# Patient Record
Sex: FEMALE | Race: WHITE | NOT HISPANIC OR LATINO | URBAN - METROPOLITAN AREA
[De-identification: names, ages, dates, MRNs, and addresses within clinical notes are randomized per-mention and may not be internally consistent; named-entity substitution may affect disease eponyms.]

---

## 2018-03-24 ENCOUNTER — EMERGENCY (EMERGENCY)
Facility: HOSPITAL | Age: 83
LOS: 1 days | Discharge: HOME | End: 2018-03-24
Admitting: EMERGENCY MEDICINE

## 2018-03-24 VITALS
TEMPERATURE: 98 F | SYSTOLIC BLOOD PRESSURE: 132 MMHG | DIASTOLIC BLOOD PRESSURE: 84 MMHG | RESPIRATION RATE: 18 BRPM | HEART RATE: 101 BPM | OXYGEN SATURATION: 98 %

## 2018-03-28 DIAGNOSIS — K08.89 OTHER SPECIFIED DISORDERS OF TEETH AND SUPPORTING STRUCTURES: ICD-10-CM

## 2018-03-29 ENCOUNTER — INPATIENT (INPATIENT)
Facility: HOSPITAL | Age: 83
LOS: 3 days | Discharge: HOME | DRG: 375 | End: 2018-04-02
Attending: STUDENT IN AN ORGANIZED HEALTH CARE EDUCATION/TRAINING PROGRAM | Admitting: STUDENT IN AN ORGANIZED HEALTH CARE EDUCATION/TRAINING PROGRAM
Payer: MEDICARE

## 2018-03-29 VITALS
SYSTOLIC BLOOD PRESSURE: 117 MMHG | DIASTOLIC BLOOD PRESSURE: 72 MMHG | HEART RATE: 109 BPM | WEIGHT: 158.95 LBS | OXYGEN SATURATION: 96 % | TEMPERATURE: 98 F | RESPIRATION RATE: 18 BRPM

## 2018-03-29 LAB
ALBUMIN SERPL ELPH-MCNC: 4.1 G/DL — SIGNIFICANT CHANGE UP (ref 3.3–5)
ALP SERPL-CCNC: 90 U/L — SIGNIFICANT CHANGE UP (ref 40–120)
ALT FLD-CCNC: 10 U/L — SIGNIFICANT CHANGE UP (ref 10–45)
ANION GAP SERPL CALC-SCNC: 17 MMOL/L — SIGNIFICANT CHANGE UP (ref 5–17)
AST SERPL-CCNC: 20 U/L — SIGNIFICANT CHANGE UP (ref 10–40)
BASOPHILS NFR BLD AUTO: 0.2 % — SIGNIFICANT CHANGE UP (ref 0–2)
BILIRUB SERPL-MCNC: 0.7 MG/DL — SIGNIFICANT CHANGE UP (ref 0.2–1.2)
BUN SERPL-MCNC: 15 MG/DL — SIGNIFICANT CHANGE UP (ref 7–23)
CALCIUM SERPL-MCNC: 11.3 MG/DL — HIGH (ref 8.4–10.5)
CHLORIDE SERPL-SCNC: 95 MMOL/L — LOW (ref 96–108)
CO2 SERPL-SCNC: 22 MMOL/L — SIGNIFICANT CHANGE UP (ref 22–31)
CREAT SERPL-MCNC: 1.03 MG/DL — SIGNIFICANT CHANGE UP (ref 0.5–1.3)
EOSINOPHIL NFR BLD AUTO: 0.6 % — SIGNIFICANT CHANGE UP (ref 0–6)
GLUCOSE SERPL-MCNC: 147 MG/DL — HIGH (ref 70–99)
HCT VFR BLD CALC: 38 % — SIGNIFICANT CHANGE UP (ref 34.5–45)
HGB BLD-MCNC: 13.2 G/DL — SIGNIFICANT CHANGE UP (ref 11.5–15.5)
LIDOCAIN IGE QN: 22 U/L — SIGNIFICANT CHANGE UP (ref 7–60)
LYMPHOCYTES # BLD AUTO: 12.3 % — LOW (ref 13–44)
MAGNESIUM SERPL-MCNC: 1.7 MG/DL — SIGNIFICANT CHANGE UP (ref 1.6–2.6)
MCHC RBC-ENTMCNC: 29.9 PG — SIGNIFICANT CHANGE UP (ref 27–34)
MCHC RBC-ENTMCNC: 34.7 G/DL — SIGNIFICANT CHANGE UP (ref 32–36)
MCV RBC AUTO: 86 FL — SIGNIFICANT CHANGE UP (ref 80–100)
MONOCYTES NFR BLD AUTO: 10.7 % — SIGNIFICANT CHANGE UP (ref 2–14)
NEUTROPHILS NFR BLD AUTO: 76.2 % — SIGNIFICANT CHANGE UP (ref 43–77)
PLATELET # BLD AUTO: 312 K/UL — SIGNIFICANT CHANGE UP (ref 150–400)
POTASSIUM SERPL-MCNC: 4.1 MMOL/L — SIGNIFICANT CHANGE UP (ref 3.5–5.3)
POTASSIUM SERPL-SCNC: 4.1 MMOL/L — SIGNIFICANT CHANGE UP (ref 3.5–5.3)
PROT SERPL-MCNC: 8.5 G/DL — HIGH (ref 6–8.3)
RBC # BLD: 4.42 M/UL — SIGNIFICANT CHANGE UP (ref 3.8–5.2)
RBC # FLD: 12.8 % — SIGNIFICANT CHANGE UP (ref 10.3–16.9)
SODIUM SERPL-SCNC: 134 MMOL/L — LOW (ref 135–145)
WBC # BLD: 13 K/UL — HIGH (ref 3.8–10.5)
WBC # FLD AUTO: 13 K/UL — HIGH (ref 3.8–10.5)

## 2018-03-29 PROCEDURE — 74176 CT ABD & PELVIS W/O CONTRAST: CPT | Mod: 26

## 2018-03-29 PROCEDURE — 99285 EMERGENCY DEPT VISIT HI MDM: CPT

## 2018-03-29 RX ORDER — IOHEXOL 300 MG/ML
50 INJECTION, SOLUTION INTRAVENOUS ONCE
Qty: 0 | Refills: 0 | Status: COMPLETED | OUTPATIENT
Start: 2018-03-29 | End: 2018-03-29

## 2018-03-29 RX ORDER — SODIUM CHLORIDE 9 MG/ML
3 INJECTION INTRAMUSCULAR; INTRAVENOUS; SUBCUTANEOUS ONCE
Qty: 0 | Refills: 0 | Status: COMPLETED | OUTPATIENT
Start: 2018-03-29 | End: 2018-03-29

## 2018-03-29 RX ORDER — SODIUM CHLORIDE 9 MG/ML
1000 INJECTION INTRAMUSCULAR; INTRAVENOUS; SUBCUTANEOUS
Qty: 0 | Refills: 0 | Status: DISCONTINUED | OUTPATIENT
Start: 2018-03-29 | End: 2018-03-30

## 2018-03-29 RX ADMIN — IOHEXOL 50 MILLILITER(S): 300 INJECTION, SOLUTION INTRAVENOUS at 20:16

## 2018-03-29 RX ADMIN — SODIUM CHLORIDE 125 MILLILITER(S): 9 INJECTION INTRAMUSCULAR; INTRAVENOUS; SUBCUTANEOUS at 20:16

## 2018-03-29 RX ADMIN — SODIUM CHLORIDE 3 MILLILITER(S): 9 INJECTION INTRAMUSCULAR; INTRAVENOUS; SUBCUTANEOUS at 20:16

## 2018-03-29 NOTE — ED PROVIDER NOTE - MEDICAL DECISION MAKING DETAILS
Pt p/w abdominal pain, constipation, intermittent diarrhea. DDx includes diver Pt p/w abdominal pain, constipation, intermittent diarrhea. DDx includes diverticulitis, colitis, partial SBO, SBO, constipation, other intra-abdominal pathology. Check labs, CT a/p, IVF, NPO. Declines analgesia at this time. Dispo pending w/u and clinical status

## 2018-03-29 NOTE — ED PROVIDER NOTE - OBJECTIVE STATEMENT
Pt with PMHx HTN, HLD Pt with PMHx HTN, HLD, "hoarder," pre-DM, ? CAD, uterine CA s/p hysterectomy 2001, s/p RT p/w abdominal pain. Pt reports diarrhea s/p trip to [place] in 1992. Pt realized 3-4 weeks ago she could take Imodium for diarrhea. Pt reports 1 week ago she took 2 tablets of Imodium 1 day, followed by 1 tablet the next day, and now has had no BM in 4-5 days. Pt c/o crampy / achy constant lower abdominal pain, LLQ > RLQ, moderate in severity, with associated nausea and anorexia. No f/c. No urinary complaints. Pt reports baseline urinary incontinence. Pt states no one has been able to tell her what the cause of her diarrhea is.

## 2018-03-29 NOTE — ED PROVIDER NOTE - PROGRESS NOTE DETAILS
Message left for Dr Voss at this time regarding this pt Pt reports in 2001 when diagnosed w/ uterine CA, she was told it was in a lymph node. Pt reports she has not seen her gyn in approx 2 years, and no one told her she still had cancer in her body, or new cancer. Pt wishes treatment if possible. D/w pt d/c w/ outpt f/u vs admission for onset of w/u. Pt has opted to be admitted.

## 2018-03-29 NOTE — ED PROVIDER NOTE - GASTROINTESTINAL, MLM
Abd soft, ND, hypoactive BS. Mild diffuse lower abd ttp, L > R. No guarding, rebound, or rigidity. No pulsatile abdominal masses. No organomegaly appreciated. No CVAT

## 2018-03-29 NOTE — ED ADULT NURSE NOTE - OBJECTIVE STATEMENT
81 y/o female c/o abdominal pain and no BM x4 days. Patient states "I usually have diarrhea so I take a lot of imodium, I haven't had diarrhea but I kept taking the imodium and now I haven't gone in a couple days." Reports abdomen seems more distended than usual. RLQ tender on palpation.

## 2018-03-29 NOTE — ED ADULT TRIAGE NOTE - OTHER COMPLAINTS
pt reports decreased appetite and generalized abdominal pain over the past 4 weeks. pt states she had diarrhea early this week. denies n/v, fever.

## 2018-03-29 NOTE — ED PROVIDER NOTE - CARE PLAN
Principal Discharge DX:	Malignancy Principal Discharge DX:	Malignancy  Secondary Diagnosis:	Abdominal pain, unspecified abdominal location

## 2018-03-29 NOTE — ED ADULT NURSE NOTE - CHPI ED SYMPTOMS NEG
no diarrhea/no fever/no blood in stool/no dysuria/no vomiting/no chills/no hematuria/no nausea/no burning urination

## 2018-03-30 ENCOUNTER — TRANSCRIPTION ENCOUNTER (OUTPATIENT)
Age: 83
End: 2018-03-30

## 2018-03-30 DIAGNOSIS — E03.9 HYPOTHYROIDISM, UNSPECIFIED: ICD-10-CM

## 2018-03-30 DIAGNOSIS — I10 ESSENTIAL (PRIMARY) HYPERTENSION: ICD-10-CM

## 2018-03-30 DIAGNOSIS — R73.03 PREDIABETES: ICD-10-CM

## 2018-03-30 DIAGNOSIS — C55 MALIGNANT NEOPLASM OF UTERUS, PART UNSPECIFIED: ICD-10-CM

## 2018-03-30 DIAGNOSIS — I25.10 ATHEROSCLEROTIC HEART DISEASE OF NATIVE CORONARY ARTERY WITHOUT ANGINA PECTORIS: ICD-10-CM

## 2018-03-30 DIAGNOSIS — Z98.890 OTHER SPECIFIED POSTPROCEDURAL STATES: Chronic | ICD-10-CM

## 2018-03-30 DIAGNOSIS — R19.00 INTRA-ABDOMINAL AND PELVIC SWELLING, MASS AND LUMP, UNSPECIFIED SITE: ICD-10-CM

## 2018-03-30 DIAGNOSIS — Z51.5 ENCOUNTER FOR PALLIATIVE CARE: ICD-10-CM

## 2018-03-30 DIAGNOSIS — K21.9 GASTRO-ESOPHAGEAL REFLUX DISEASE WITHOUT ESOPHAGITIS: ICD-10-CM

## 2018-03-30 DIAGNOSIS — J45.909 UNSPECIFIED ASTHMA, UNCOMPLICATED: ICD-10-CM

## 2018-03-30 DIAGNOSIS — R10.9 UNSPECIFIED ABDOMINAL PAIN: ICD-10-CM

## 2018-03-30 DIAGNOSIS — R63.8 OTHER SYMPTOMS AND SIGNS CONCERNING FOOD AND FLUID INTAKE: ICD-10-CM

## 2018-03-30 DIAGNOSIS — Z71.89 OTHER SPECIFIED COUNSELING: ICD-10-CM

## 2018-03-30 LAB
ANION GAP SERPL CALC-SCNC: 12 MMOL/L — SIGNIFICANT CHANGE UP (ref 5–17)
APPEARANCE UR: CLEAR — SIGNIFICANT CHANGE UP
BACTERIA # UR AUTO: PRESENT /HPF
BILIRUB UR-MCNC: NEGATIVE — SIGNIFICANT CHANGE UP
BUN SERPL-MCNC: 14 MG/DL — SIGNIFICANT CHANGE UP (ref 7–23)
CALCIUM SERPL-MCNC: 9.8 MG/DL — SIGNIFICANT CHANGE UP (ref 8.4–10.5)
CHLORIDE SERPL-SCNC: 99 MMOL/L — SIGNIFICANT CHANGE UP (ref 96–108)
CO2 SERPL-SCNC: 23 MMOL/L — SIGNIFICANT CHANGE UP (ref 22–31)
COLOR SPEC: YELLOW — SIGNIFICANT CHANGE UP
CREAT SERPL-MCNC: 0.92 MG/DL — SIGNIFICANT CHANGE UP (ref 0.5–1.3)
DIFF PNL FLD: (no result)
EPI CELLS # UR: SIGNIFICANT CHANGE UP /HPF (ref 0–5)
GLUCOSE BLDC GLUCOMTR-MCNC: 112 MG/DL — HIGH (ref 70–99)
GLUCOSE BLDC GLUCOMTR-MCNC: 122 MG/DL — HIGH (ref 70–99)
GLUCOSE BLDC GLUCOMTR-MCNC: 122 MG/DL — HIGH (ref 70–99)
GLUCOSE BLDC GLUCOMTR-MCNC: 93 MG/DL — SIGNIFICANT CHANGE UP (ref 70–99)
GLUCOSE SERPL-MCNC: 112 MG/DL — HIGH (ref 70–99)
GLUCOSE UR QL: NEGATIVE — SIGNIFICANT CHANGE UP
HBA1C BLD-MCNC: 5.4 % — SIGNIFICANT CHANGE UP (ref 4–5.6)
HCT VFR BLD CALC: 33.6 % — LOW (ref 34.5–45)
HGB BLD-MCNC: 11.1 G/DL — LOW (ref 11.5–15.5)
KETONES UR-MCNC: NEGATIVE — SIGNIFICANT CHANGE UP
LEUKOCYTE ESTERASE UR-ACNC: NEGATIVE — SIGNIFICANT CHANGE UP
MAGNESIUM SERPL-MCNC: 1.6 MG/DL — SIGNIFICANT CHANGE UP (ref 1.6–2.6)
MCHC RBC-ENTMCNC: 29.4 PG — SIGNIFICANT CHANGE UP (ref 27–34)
MCHC RBC-ENTMCNC: 33 G/DL — SIGNIFICANT CHANGE UP (ref 32–36)
MCV RBC AUTO: 88.9 FL — SIGNIFICANT CHANGE UP (ref 80–100)
NITRITE UR-MCNC: NEGATIVE — SIGNIFICANT CHANGE UP
PH UR: 7 — SIGNIFICANT CHANGE UP (ref 5–8)
PLATELET # BLD AUTO: 236 K/UL — SIGNIFICANT CHANGE UP (ref 150–400)
POTASSIUM SERPL-MCNC: 4 MMOL/L — SIGNIFICANT CHANGE UP (ref 3.5–5.3)
POTASSIUM SERPL-SCNC: 4 MMOL/L — SIGNIFICANT CHANGE UP (ref 3.5–5.3)
PROT UR-MCNC: NEGATIVE MG/DL — SIGNIFICANT CHANGE UP
RBC # BLD: 3.78 M/UL — LOW (ref 3.8–5.2)
RBC # FLD: 13 % — SIGNIFICANT CHANGE UP (ref 10.3–16.9)
RBC CASTS # UR COMP ASSIST: < 5 /HPF — SIGNIFICANT CHANGE UP
SODIUM SERPL-SCNC: 134 MMOL/L — LOW (ref 135–145)
SP GR SPEC: 1.01 — SIGNIFICANT CHANGE UP (ref 1–1.03)
TSH SERPL-MCNC: 2.6 UIU/ML — SIGNIFICANT CHANGE UP (ref 0.35–4.94)
UROBILINOGEN FLD QL: 1 E.U./DL — SIGNIFICANT CHANGE UP
WBC # BLD: 9.6 K/UL — SIGNIFICANT CHANGE UP (ref 3.8–10.5)
WBC # FLD AUTO: 9.6 K/UL — SIGNIFICANT CHANGE UP (ref 3.8–10.5)
WBC UR QL: < 5 /HPF — SIGNIFICANT CHANGE UP

## 2018-03-30 PROCEDURE — 99223 1ST HOSP IP/OBS HIGH 75: CPT

## 2018-03-30 PROCEDURE — 99223 1ST HOSP IP/OBS HIGH 75: CPT | Mod: AI,GC

## 2018-03-30 PROCEDURE — 99497 ADVNCD CARE PLAN 30 MIN: CPT | Mod: 25

## 2018-03-30 PROCEDURE — 71260 CT THORAX DX C+: CPT | Mod: 26

## 2018-03-30 PROCEDURE — 99498 ADVNCD CARE PLAN ADDL 30 MIN: CPT

## 2018-03-30 RX ORDER — RANITIDINE HYDROCHLORIDE 150 MG/1
1 TABLET, FILM COATED ORAL
Qty: 0 | Refills: 0 | COMMUNITY

## 2018-03-30 RX ORDER — OXYBUTYNIN CHLORIDE 5 MG
10 TABLET ORAL DAILY
Qty: 0 | Refills: 0 | Status: DISCONTINUED | OUTPATIENT
Start: 2018-03-30 | End: 2018-04-02

## 2018-03-30 RX ORDER — ATORVASTATIN CALCIUM 80 MG/1
1 TABLET, FILM COATED ORAL
Qty: 0 | Refills: 0 | COMMUNITY

## 2018-03-30 RX ORDER — DEXTROSE 50 % IN WATER 50 %
1 SYRINGE (ML) INTRAVENOUS ONCE
Qty: 0 | Refills: 0 | Status: DISCONTINUED | OUTPATIENT
Start: 2018-03-30 | End: 2018-03-31

## 2018-03-30 RX ORDER — POLYETHYLENE GLYCOL 3350 17 G/17G
17 POWDER, FOR SOLUTION ORAL
Qty: 0 | Refills: 0 | Status: DISCONTINUED | OUTPATIENT
Start: 2018-03-30 | End: 2018-04-02

## 2018-03-30 RX ORDER — ERGOCALCIFEROL 1.25 MG/1
50000 CAPSULE ORAL
Qty: 0 | Refills: 0 | Status: DISCONTINUED | OUTPATIENT
Start: 2018-03-30 | End: 2018-04-02

## 2018-03-30 RX ORDER — INSULIN LISPRO 100/ML
VIAL (ML) SUBCUTANEOUS
Qty: 0 | Refills: 0 | Status: DISCONTINUED | OUTPATIENT
Start: 2018-03-30 | End: 2018-03-31

## 2018-03-30 RX ORDER — LEVOTHYROXINE SODIUM 125 MCG
1 TABLET ORAL
Qty: 0 | Refills: 0 | COMMUNITY

## 2018-03-30 RX ORDER — FAMOTIDINE 10 MG/ML
20 INJECTION INTRAVENOUS
Qty: 0 | Refills: 0 | Status: DISCONTINUED | OUTPATIENT
Start: 2018-03-30 | End: 2018-04-02

## 2018-03-30 RX ORDER — MAGNESIUM SULFATE 500 MG/ML
2 VIAL (ML) INJECTION ONCE
Qty: 0 | Refills: 0 | Status: COMPLETED | OUTPATIENT
Start: 2018-03-30 | End: 2018-03-30

## 2018-03-30 RX ORDER — DEXTROSE 50 % IN WATER 50 %
25 SYRINGE (ML) INTRAVENOUS ONCE
Qty: 0 | Refills: 0 | Status: DISCONTINUED | OUTPATIENT
Start: 2018-03-30 | End: 2018-03-31

## 2018-03-30 RX ORDER — METOPROLOL TARTRATE 50 MG
50 TABLET ORAL
Qty: 0 | Refills: 0 | Status: DISCONTINUED | OUTPATIENT
Start: 2018-03-30 | End: 2018-04-02

## 2018-03-30 RX ORDER — OXYBUTYNIN CHLORIDE 5 MG
1 TABLET ORAL
Qty: 0 | Refills: 0 | COMMUNITY

## 2018-03-30 RX ORDER — BUDESONIDE, MICRONIZED 100 %
1 POWDER (GRAM) MISCELLANEOUS
Qty: 0 | Refills: 0 | Status: DISCONTINUED | OUTPATIENT
Start: 2018-03-30 | End: 2018-04-02

## 2018-03-30 RX ORDER — CALCIUM CARBONATE 500(1250)
2 TABLET ORAL EVERY 8 HOURS
Qty: 0 | Refills: 0 | Status: DISCONTINUED | OUTPATIENT
Start: 2018-03-30 | End: 2018-04-02

## 2018-03-30 RX ORDER — DEXTROSE 50 % IN WATER 50 %
12.5 SYRINGE (ML) INTRAVENOUS ONCE
Qty: 0 | Refills: 0 | Status: DISCONTINUED | OUTPATIENT
Start: 2018-03-30 | End: 2018-03-31

## 2018-03-30 RX ORDER — ISOSORBIDE MONONITRATE 60 MG/1
1 TABLET, EXTENDED RELEASE ORAL
Qty: 0 | Refills: 0 | COMMUNITY

## 2018-03-30 RX ORDER — CLOPIDOGREL BISULFATE 75 MG/1
75 TABLET, FILM COATED ORAL DAILY
Qty: 0 | Refills: 0 | Status: DISCONTINUED | OUTPATIENT
Start: 2018-03-30 | End: 2018-04-01

## 2018-03-30 RX ORDER — SODIUM CHLORIDE 9 MG/ML
1000 INJECTION INTRAMUSCULAR; INTRAVENOUS; SUBCUTANEOUS
Qty: 0 | Refills: 0 | Status: DISCONTINUED | OUTPATIENT
Start: 2018-03-30 | End: 2018-03-30

## 2018-03-30 RX ORDER — SODIUM CHLORIDE 9 MG/ML
1000 INJECTION, SOLUTION INTRAVENOUS
Qty: 0 | Refills: 0 | Status: DISCONTINUED | OUTPATIENT
Start: 2018-03-30 | End: 2018-03-31

## 2018-03-30 RX ORDER — LEVOTHYROXINE SODIUM 125 MCG
25 TABLET ORAL DAILY
Qty: 0 | Refills: 0 | Status: DISCONTINUED | OUTPATIENT
Start: 2018-03-30 | End: 2018-04-02

## 2018-03-30 RX ORDER — GLUCAGON INJECTION, SOLUTION 0.5 MG/.1ML
1 INJECTION, SOLUTION SUBCUTANEOUS ONCE
Qty: 0 | Refills: 0 | Status: DISCONTINUED | OUTPATIENT
Start: 2018-03-30 | End: 2018-03-31

## 2018-03-30 RX ORDER — ACETAMINOPHEN 500 MG
650 TABLET ORAL EVERY 6 HOURS
Qty: 0 | Refills: 0 | Status: DISCONTINUED | OUTPATIENT
Start: 2018-03-30 | End: 2018-04-02

## 2018-03-30 RX ORDER — ATORVASTATIN CALCIUM 80 MG/1
20 TABLET, FILM COATED ORAL AT BEDTIME
Qty: 0 | Refills: 0 | Status: DISCONTINUED | OUTPATIENT
Start: 2018-03-30 | End: 2018-04-02

## 2018-03-30 RX ORDER — ALBUTEROL 90 UG/1
2 AEROSOL, METERED ORAL
Qty: 0 | Refills: 0 | COMMUNITY

## 2018-03-30 RX ORDER — ENOXAPARIN SODIUM 100 MG/ML
40 INJECTION SUBCUTANEOUS EVERY 24 HOURS
Qty: 0 | Refills: 0 | Status: DISCONTINUED | OUTPATIENT
Start: 2018-03-30 | End: 2018-04-01

## 2018-03-30 RX ORDER — SPIRONOLACTONE 25 MG/1
1 TABLET, FILM COATED ORAL
Qty: 0 | Refills: 0 | COMMUNITY

## 2018-03-30 RX ORDER — METOPROLOL TARTRATE 50 MG
1 TABLET ORAL
Qty: 0 | Refills: 0 | COMMUNITY

## 2018-03-30 RX ORDER — CLOPIDOGREL BISULFATE 75 MG/1
1 TABLET, FILM COATED ORAL
Qty: 0 | Refills: 0 | COMMUNITY

## 2018-03-30 RX ORDER — BUDESONIDE, MICRONIZED 100 %
1 POWDER (GRAM) MISCELLANEOUS
Qty: 0 | Refills: 0 | COMMUNITY

## 2018-03-30 RX ORDER — CHOLECALCIFEROL (VITAMIN D3) 125 MCG
1 CAPSULE ORAL
Qty: 0 | Refills: 0 | COMMUNITY

## 2018-03-30 RX ADMIN — CLOPIDOGREL BISULFATE 75 MILLIGRAM(S): 75 TABLET, FILM COATED ORAL at 21:55

## 2018-03-30 RX ADMIN — ATORVASTATIN CALCIUM 20 MILLIGRAM(S): 80 TABLET, FILM COATED ORAL at 21:55

## 2018-03-30 RX ADMIN — ERGOCALCIFEROL 50000 UNIT(S): 1.25 CAPSULE ORAL at 13:15

## 2018-03-30 RX ADMIN — Medication 50 MILLIGRAM(S): at 18:48

## 2018-03-30 RX ADMIN — FAMOTIDINE 20 MILLIGRAM(S): 10 INJECTION INTRAVENOUS at 18:49

## 2018-03-30 RX ADMIN — Medication 50 MILLIGRAM(S): at 06:58

## 2018-03-30 RX ADMIN — POLYETHYLENE GLYCOL 3350 17 GRAM(S): 17 POWDER, FOR SOLUTION ORAL at 21:55

## 2018-03-30 RX ADMIN — ENOXAPARIN SODIUM 40 MILLIGRAM(S): 100 INJECTION SUBCUTANEOUS at 06:59

## 2018-03-30 RX ADMIN — Medication 2 TABLET(S): at 22:52

## 2018-03-30 RX ADMIN — Medication 25 MICROGRAM(S): at 06:58

## 2018-03-30 RX ADMIN — FAMOTIDINE 20 MILLIGRAM(S): 10 INJECTION INTRAVENOUS at 06:59

## 2018-03-30 RX ADMIN — Medication 50 GRAM(S): at 18:49

## 2018-03-30 RX ADMIN — Medication 1 PUFF(S): at 18:49

## 2018-03-30 RX ADMIN — Medication 10 MILLIGRAM(S): at 13:15

## 2018-03-30 RX ADMIN — Medication 1 PUFF(S): at 06:59

## 2018-03-30 NOTE — DISCHARGE NOTE ADULT - CARE PROVIDERS DIRECT ADDRESSES
,DirectAddress_Unknown ,DirectAddress_Unknown,DirectAddress_Unknown ,DirectAddress_Unknown,DirectAddress_Unknown,[email or web address]

## 2018-03-30 NOTE — DISCHARGE NOTE ADULT - PATIENT PORTAL LINK FT
You can access the [facility] Patient Portal, offered by [facility], by registering with the following website: [email or web address]

## 2018-03-30 NOTE — DISCHARGE NOTE ADULT - CARE PLAN
Principal Discharge DX:	Advance care planning  Secondary Diagnosis:	Abdominal pain, unspecified abdominal location  Secondary Diagnosis:	Retroperitoneal mass  Secondary Diagnosis:	Asthma  Secondary Diagnosis:	CAD (coronary artery disease)  Secondary Diagnosis:	HTN (hypertension)  Secondary Diagnosis:	Hypothyroidism Principal Discharge DX:	Advance care planning  Goal:	Outpatient advance care planning  Assessment and plan of treatment:	You were seen by the palliative care team and completed a MOLST form in which you indicated that you want to be DNR/DNI and would not want aggressive measures such as pressors and ICU care. You were informed of the extent of your likely malignancy and were seen by oncology for prognosis.  Secondary Diagnosis:	Abdominal pain, unspecified abdominal location  Assessment and plan of treatment:	You reported abdominal pain likely secondary to constipation and the multiple masses seen on CT imaging concerning for metastasis.  Secondary Diagnosis:	Retroperitoneal mass  Assessment and plan of treatment:	You were found to have multiple retroperitoneal masses seen on  Secondary Diagnosis:	Asthma  Assessment and plan of treatment:	No changes were made to your medications for asthma. Please followup with your primary care provider for continued management.  Secondary Diagnosis:	CAD (coronary artery disease)  Secondary Diagnosis:	HTN (hypertension)  Secondary Diagnosis:	Hypothyroidism Principal Discharge DX:	Advance care planning  Goal:	Outpatient advance care planning  Assessment and plan of treatment:	You were seen by the palliative care team and completed a MOLST form in which you indicated that you want to be DNR/DNI and would not want aggressive measures such as pressors and ICU care. You were informed of the extent of your likely malignancy and were seen by oncology for prognosis.  Secondary Diagnosis:	Abdominal pain, unspecified abdominal location  Assessment and plan of treatment:	You reported abdominal pain likely secondary to constipation and the multiple masses seen on CT imaging concerning for metastasis.  Secondary Diagnosis:	Retroperitoneal mass  Assessment and plan of treatment:	You were found to have multiple retroperitoneal masses seen on CT imaging of your abdomen and pelvis. You were seen by oncology as well as palliative care. You were told of the extent of your disease and wished for no chemotherapy or heroic interventions.  Secondary Diagnosis:	Asthma  Assessment and plan of treatment:	No changes were made to your medications for asthma. Please followup with your primary care provider for continued management.  Secondary Diagnosis:	CAD (coronary artery disease)  Assessment and plan of treatment:	No changes were made to your medications for CAD. Please followup with your primary care provider.  Secondary Diagnosis:	HTN (hypertension)  Assessment and plan of treatment:	No changes were made to your medications for hypertension. Please followup with your primary care provider.  Secondary Diagnosis:	Hypothyroidism  Assessment and plan of treatment:	No changes were made to your medication for hypothyroidism. Please followup with your primary care provider. Principal Discharge DX:	Advance care planning  Goal:	Outpatient advance care planning  Assessment and plan of treatment:	You were seen by the palliative care team and completed a MOLST form in which you indicated that you want to be DNR/DNI and would not want aggressive measures such as pressors and ICU care. You were informed of the extent of your likely malignancy and were seen by oncology for prognosis.  Secondary Diagnosis:	Abdominal pain, unspecified abdominal location  Assessment and plan of treatment:	You reported abdominal pain likely secondary to constipation and the multiple masses seen on CT imaging concerning for metastasis.  Secondary Diagnosis:	Retroperitoneal mass  Assessment and plan of treatment:	You were found to have multiple retroperitoneal masses seen on CT imaging of your abdomen and pelvis. You were seen by gyn-oncology as well as palliative care. You were told of the extent of your disease and wished for no chemotherapy or heroic interventions. You underwent IR guided biopsy of one of your masses, pathology report pending. Please followup with Dr. Cuenca and Dr. Voss for continued management.  Secondary Diagnosis:	Asthma  Assessment and plan of treatment:	No changes were made to your medications for asthma. Please followup with your primary care provider for continued management.  Secondary Diagnosis:	CAD (coronary artery disease)  Assessment and plan of treatment:	No changes were made to your medications for CAD. Please followup with your primary care provider.  Secondary Diagnosis:	HTN (hypertension)  Assessment and plan of treatment:	No changes were made to your medications for hypertension. Please followup with your primary care provider.  Secondary Diagnosis:	Hypothyroidism  Assessment and plan of treatment:	No changes were made to your medication for hypothyroidism. Please followup with your primary care provider. Principal Discharge DX:	Advance care planning  Goal:	Outpatient advance care planning  Assessment and plan of treatment:	You were seen by the palliative care team and completed a MOLST form in which you indicated that you want to be DNR/DNI and would not want aggressive measures such as pressors and ICU care. You were informed of the extent of your likely malignancy and were seen by oncology for prognosis.  Secondary Diagnosis:	Abdominal pain, unspecified abdominal location  Assessment and plan of treatment:	You reported abdominal pain likely secondary to constipation and the multiple masses seen on CT imaging concerning for metastasis. Your abdominal pain improved after you were placed on a bowel regimen and had a bowel movement. Please consume a diet high in fiber and take stool softeners as needed for constipation.  Secondary Diagnosis:	Retroperitoneal mass  Assessment and plan of treatment:	You were found to have multiple retroperitoneal masses seen on CT imaging of your abdomen and pelvis. You were seen by gyn-oncology as well as palliative care. You were told of the extent of your disease and wished for no chemotherapy or heroic interventions. You underwent IR guided biopsy of one of your masses, pathology report pending. You have an appointment scheduled with Dr. Saba Silver on 4/9 at 2PM. Please followup with Dr. Voss as well within a week.  Secondary Diagnosis:	Asthma  Assessment and plan of treatment:	No changes were made to your medications for asthma. Please followup with your primary care provider for continued management.  Secondary Diagnosis:	CAD (coronary artery disease)  Assessment and plan of treatment:	No changes were made to your medications for CAD. Please followup with your primary care provider.  Secondary Diagnosis:	HTN (hypertension)  Assessment and plan of treatment:	No changes were made to your medications for hypertension. Please followup with your primary care provider.  Secondary Diagnosis:	Hypothyroidism  Assessment and plan of treatment:	No changes were made to your medication for hypothyroidism. Please followup with your primary care provider.

## 2018-03-30 NOTE — H&P ADULT - PMH
Asthma    CAD (coronary artery disease)    GERD (gastroesophageal reflux disease)    HTN (hypertension)    Hypothyroidism    Prediabetes    Uterine cancer

## 2018-03-30 NOTE — H&P ADULT - PROBLEM SELECTOR PLAN 4
Patient reports taking Spironolactone and Isosorbide mononitrate at home for HTN. On admission, BPs stable without antihypertensives. Likely 2/2 poor PO intake.   - hold Spironolactone 25mg TID and Isosorbide mononitrate 30mg QHS; restart as tolerated   - clarify with PMD dosing of Spironolactone prior to restarting as it's prescribed for 25mg TID

## 2018-03-30 NOTE — CONSULT NOTE ADULT - PROBLEM SELECTOR RECOMMENDATION 3
DNR/DNI.  Support provided to pt. Patient to have access to supportive services during rest of hospital stay as the pt/family deems necessary i.e. Chaplaincy, Massage Therapy, Music Therapy, Pt/family supportive services, Palliative SW, etc.

## 2018-03-30 NOTE — CONSULT NOTE ADULT - PROBLEM SELECTOR RECOMMENDATION 4
ADVANCE CARE PLANNING MEETING  START TIME: 1300  END TIME: 1355  TOTAL TIME: 55    A FACE TO FACE MEETING TO DISCUSS ADVANCE CARE PLANNING WAS HELD TODAY REGARDING: LAINE CHAUDHARI  PRIMARY DECISION MAKER: pt  ALTERNATE/SURROGATE:  DISCUSSED ADVANCE DIRECTIVES INCLUDING, BUT NOT LIMITED TO, HEALTHCARE PROXY AND CODE STATUS.  DECISION REGARDING CODE STATUS: DNR/DNI  DOCUMENTATION COMPLETED TODAY: MISBAH CISNEROS DNR form

## 2018-03-30 NOTE — H&P ADULT - PROBLEM SELECTOR PLAN 3
s/p hysterectomy on 9/10/11 with subsequent radiation therapy at that time.   - plan as above s/p hysterectomy on 9/10/01 with subsequent radiation therapy at that time.   - plan as above

## 2018-03-30 NOTE — H&P ADULT - PROBLEM SELECTOR PLAN 10
F: NS@70cc/hr  E: monitor and replete lytes PRN   N: diabetic/DASH diet     PPx: Lovenox     DISPO: RMF

## 2018-03-30 NOTE — DISCHARGE NOTE ADULT - PROVIDER TOKENS
TOKEN:'[id number]' TOKEN:'[id number]',FREE:[LAST:[Nadeem],FIRST:[Saba],PHONE:[phone]:[(   )    -],ADDRESS:[[address] [place] [address], [place] [address]]] TOKEN:'[id number]',FREE:[LAST:[Nadeem],FIRST:[Saab],PHONE:[phone]:[(   )    -],ADDRESS:[[address] [place] [address], [place] [address]]],TOKEN:'[id number]'

## 2018-03-30 NOTE — H&P ADULT - PROBLEM SELECTOR PLAN 8
On Ranitidine 300mg BID at home.   - start Famotidine 20mg BID (therapeutic interchange)    #Hypercalcemia: likely 2/2 metastatic disease. Asymptomatic.   - continue to monitor  - if no resolution following IVF, obtain PTH and 25OH Vit D

## 2018-03-30 NOTE — DISCHARGE NOTE ADULT - HOSPITAL COURSE
82F with PMHx of HTN, non-obstructive CAD (last angiogram in 2017, no hx of stents), preDM, hypothyroidism, hx of uterine cancer s/p hysterectomy and radiation therapy in 2001 (followed previously by Dr. Saba Silver, reports being told at that time that she has evidence of metastatic disease to lymph nodes and "bloodstream" but hasn't had oncologic treatment) who presents with complaints of new abdominal pressure, cramp-like pain in the LLQ >RLQ since yesterday. Patient reports history of chronic diarrhea since the 1990s after reportedly returning from a trip to [place], described as multiple watery/loose brown bowel movements daily without blood, recently began taking Imodium to help with the diarrhea. Last Imodium use this week, unsure which day, hasn't had a BM since then. Patient endorses recent weight loss, intentional and unintentional, associated with poor appetite and exercising. Denies fevers, chills, diaphoresis, CP, SOB, nausea/vomiting, dysuria.   Patient reports having a history of uterine cancer that was treated. However, at the time of surgery and radiation therapy, patient recalls being told by oncologist that her cancer had spread to lymph nodes and "the bloodstream". Initially had regular follow up with oncologist were she states that she was always told she has "active" cancer despite intervention on uterus. Last follow up with oncologist was 2 years ago. Last colonoscopy x4yrs ago, was told to return in 5yrs. Last mammogram 3yrs ago, reports possible mass near nipple, though unsure.     In the ED, patient afebrile, -109, -117/62-72, RR 18, SpO2 96% on RA. Given abdominal pain, underwent CT A/P which shows: Soft tissue mass within the region of the left adnexa measuring 3.8 x 1.5 x 1.4 cm, which may represent an ovarian mass versus retroperitoneal soft tissue mass; bilateral bulky retroperitoneal masses with soft tissue density at the level of the kidneys with mass to the right midline measuring 4.6 x 3.9 x 6.6 cm and interposed between the right kidney and the aorta and is compressing the IVC posteriorly, contralateral mass to the left of midline measures at least 6.8 x 5.6 x 9.5 cm, areas of low-attenuation along the lateral aspect of the soft tissue mass are suggestive for intrinsic areas of necrosis. This mass demonstrates intrinsic calcifications, and appears to partially encase the abdominal aorta. The mass also extends into the left iliopsoas muscle which is asymmetrically enlarged. It is difficult to say whether not the mass is intrinsic or extrinsic to the left iliopsoas muscle. The soft tissue mass extends inferiorly past the bifurcation to the level of the common iliac arteries. There is a small 8 mm soft tissue nodule within the mesenteric fat of the right lower quadrant. There is a moderate amount of fat stranding surrounding these masses. There is an additional soft tissue mass within the left lower quadrant, subjacent to the left rectus abdominis muscle, measuring 3.7 x 2.1 x 2.5 cm. Patient was informed of CT findings by ED provider, admitted for oncology consult in AM and possible malignancy workup. 82F with PMHx of HTN, non-obstructive CAD (last angiogram in 2017, no hx of stents), preDM, hypothyroidism, hx of uterine cancer s/p hysterectomy and radiation therapy in 2001 (followed previously by Dr. Saba Silver, reports being told at that time that she has evidence of metastatic disease to lymph nodes and "bloodstream" but hasn't had oncologic treatment) who presents with complaints of new abdominal pressure, cramp-like pain in the LLQ >RLQ since yesterday. Patient reports history of chronic diarrhea since the 1990s after reportedly returning from a trip to [place], described as multiple watery/loose brown bowel movements daily without blood, recently began taking Imodium to help with the diarrhea. Last Imodium use this week, unsure which day, hasn't had a BM since then. Patient endorses recent weight loss, intentional and unintentional, associated with poor appetite and exercising. Denies fevers, chills, diaphoresis, CP, SOB, nausea/vomiting, dysuria.   Patient reports having a history of uterine cancer that was treated. However, at the time of surgery and radiation therapy, patient recalls being told by oncologist that her cancer had spread to lymph nodes and "the bloodstream". Initially had regular follow up with oncologist were she states that she was always told she has "active" cancer despite intervention on uterus. Last follow up with oncologist was 2 years ago. Last colonoscopy x4yrs ago, was told to return in 5yrs. Last mammogram 3yrs ago, reports possible mass near nipple, though unsure.     In the ED, patient afebrile, -109, -117/62-72, RR 18, SpO2 96% on RA. Given abdominal pain, underwent CT A/P which shows: Soft tissue mass within the region of the left adnexa measuring 3.8 x 1.5 x 1.4 cm, which may represent an ovarian mass versus retroperitoneal soft tissue mass; bilateral bulky retroperitoneal masses with soft tissue density at the level of the kidneys with mass to the right midline measuring 4.6 x 3.9 x 6.6 cm and interposed between the right kidney and the aorta and is compressing the IVC posteriorly, contralateral mass to the left of midline measures at least 6.8 x 5.6 x 9.5 cm, areas of low-attenuation along the lateral aspect of the soft tissue mass are suggestive for intrinsic areas of necrosis. This mass demonstrates intrinsic calcifications, and appears to partially encase the abdominal aorta. The mass also extends into the left iliopsoas muscle which is asymmetrically enlarged. It is difficult to say whether not the mass is intrinsic or extrinsic to the left iliopsoas muscle. The soft tissue mass extends inferiorly past the bifurcation to the level of the common iliac arteries. There is a small 8 mm soft tissue nodule within the mesenteric fat of the right lower quadrant. There is a moderate amount of fat stranding surrounding these masses. There is an additional soft tissue mass within the left lower quadrant, subjacent to the left rectus abdominis muscle, measuring 3.7 x 2.1 x 2.5 cm. Patient was admitted to the medicine service for possible malignancy workup. Palliative care saw patient and patient completed a MOLST in which she wished to be DNR/DNI and would not want ICU care or pressors if needed. Patient was seen by the gyn-onc service with a recommendation for outpatient followup with patient's former physician, Dr. Saba Silver, or to follow with Dr. Patel.     CT chest revealed_________________.   US of the b/l LE revealed__________.     Patient was hemodynamically stable 82F with PMHx of HTN, non-obstructive CAD (last angiogram in 2017, no hx of stents), preDM, hypothyroidism, hx of uterine cancer s/p hysterectomy and radiation therapy in 2001 (followed previously by Dr. Saba Silver, reports being told at that time that she has evidence of metastatic disease to lymph nodes) who presents with complaints of new abdominal pressure, cramp-like pain in the LLQ >RLQ since day prior to admission.  Patient endorses recent weight loss, intentional and unintentional, associated with poor appetite and exercising. Patient reports having a history of uterine cancer that was treated. However, at the time of surgery and radiation therapy, patient recalls being told by oncologist that her cancer had spread to lymph nodes. Initially had regular follow up with oncologist were she states that she was always told she has "active" cancer despite intervention on uterus. Last follow up with oncologist was 2 years ago.     Given abdominal pain, underwent CT A/P which showed soft tissue mass within the region of the left adnexa measuring 3.8 x 1.5 x 1.4 cm, bilateral bulky retroperitoneal masses with soft tissue density at the level of the kidneys with mass to the right midline measuring 4.6 x 3.9 x 6.6 cm and interposed between the right kidney and the aorta and is compressing the IVC posteriorly, contralateral mass to the left of midline measures at least 6.8 x 5.6 x 9.5 cm which appears to partially encase the abdominal aorta, 8 mm soft tissue nodule within the mesenteric fat of the right lower quadrant, an additional soft tissue mass within the left lower quadrant, subjacent to the left rectus abdominis muscle, measuring 3.7 x 2.1 x 2.5 cm. Patient was admitted to the medicine service for malignancy workup. Palliative care saw patient and patient completed a MOLST in which she wished to be DNR/DNI and would not want ICU care or pressors if needed. Patient was seen by the gyn-onc service with a recommendation for IR guided biopsy of one of the masses.     Hospital course was complicated by LLE swelling. US of b/l LE was negative for DVT. As part of a malignancy workup, CT chest was ordered and revealed ______________.     Patient was hemodynamically stable at the time of discharge. 82F with PMHx of HTN, non-obstructive CAD (last angiogram in 2017, no hx of stents), preDM, hypothyroidism, hx of uterine cancer s/p hysterectomy and radiation therapy in 2001 (followed previously by Dr. Saba Silver, reports being told at that time that she has evidence of metastatic disease to lymph nodes) who presents with complaints of new abdominal pressure, cramp-like pain in the LLQ >RLQ since day prior to admission.  Patient endorses recent weight loss, intentional and unintentional, associated with poor appetite and exercising. Patient reports having a history of uterine cancer that was treated. However, at the time of surgery and radiation therapy, patient recalls being told by oncologist that her cancer had spread to lymph nodes. Initially had regular follow up with oncologist were she states that she was always told she has "active" cancer despite intervention on uterus. Last follow up with oncologist was 2 years ago.     Given abdominal pain, underwent CT A/P which showed soft tissue mass within the region of the left adnexa measuring 3.8 x 1.5 x 1.4 cm, bilateral bulky retroperitoneal masses with soft tissue density at the level of the kidneys with mass to the right midline measuring 4.6 x 3.9 x 6.6 cm and interposed between the right kidney and the aorta and is compressing the IVC posteriorly, contralateral mass to the left of midline measures at least 6.8 x 5.6 x 9.5 cm which appears to partially encase the abdominal aorta, 8 mm soft tissue nodule within the mesenteric fat of the right lower quadrant, an additional soft tissue mass within the left lower quadrant, subjacent to the left rectus abdominis muscle, measuring 3.7 x 2.1 x 2.5 cm. Patient was admitted to the medicine service for malignancy workup. Palliative care saw patient and patient completed a MOLST in which she wished to be DNR/DNI and would not want ICU care or pressors if needed. Patient was seen by the gyn-onc service with a recommendation for IR guided biopsy of one of the masses.     Hospital course was complicated by LLE swelling. US of b/l LE was negative for DVT. As part of a malignancy workup, CT chest was ordered and revealed new mild left supraclavicular lymphadenopathy, masses adjacent to the descending aorta that are suspicious for metastatic disease, and large left retroperitoneal lymphadenopathy, likely invading the left side of the L2 vertebral body.     Patient was hemodynamically stable at the time of discharge. 82F with PMHx of HTN, non-obstructive CAD (last angiogram in 2017, no hx of stents), preDM, hypothyroidism, hx of uterine cancer s/p hysterectomy and radiation therapy in 2001 (followed previously by Dr. Saba Silver, reports being told at that time that she has evidence of metastatic disease to lymph nodes) who presents with complaints of new abdominal pressure, cramp-like pain in the LLQ >RLQ since day prior to admission.  Patient endorses recent weight loss, intentional and unintentional, associated with poor appetite and exercising. Patient reports having a history of uterine cancer that was treated. However, at the time of surgery and radiation therapy, patient recalls being told by oncologist that her cancer had spread to lymph nodes. Initially had regular follow up with oncologist were she states that she was always told she has "active" cancer despite intervention on uterus. Last follow up with oncologist was 2 years ago.     Given abdominal pain, underwent CT A/P which showed soft tissue mass within the region of the left adnexa measuring 3.8 x 1.5 x 1.4 cm, bilateral bulky retroperitoneal masses with soft tissue density at the level of the kidneys with mass to the right midline measuring 4.6 x 3.9 x 6.6 cm and interposed between the right kidney and the aorta and is compressing the IVC posteriorly, contralateral mass to the left of midline measures at least 6.8 x 5.6 x 9.5 cm which appears to partially encase the abdominal aorta, 8 mm soft tissue nodule within the mesenteric fat of the right lower quadrant, an additional soft tissue mass within the left lower quadrant, subjacent to the left rectus abdominis muscle, measuring 3.7 x 2.1 x 2.5 cm. Patient was admitted to the medicine service for malignancy workup. Palliative care saw patient and patient completed a MOLST in which she wished to be DNR/DNI and would not want ICU care or pressors if needed. Patient was seen by the gyn-onc service with a recommendation for IR guided biopsy of one of the masses.     Hospital course was complicated by LLE swelling. US of b/l LE was negative for DVT. As part of a malignancy workup, CT chest was ordered and revealed new mild left supraclavicular lymphadenopathy, masses adjacent to the descending aorta that are suspicious for metastatic disease, and large left retroperitoneal lymphadenopathy, likely invading the left side of the L2 vertebral body.     On 4/2, patient underwent CT guided biopsy of left retroperitoneal mass, pathology report pending.      Patient was hemodynamically stable at the time of discharge and will have close outpatient follow-up.

## 2018-03-30 NOTE — H&P ADULT - PROBLEM SELECTOR PLAN 1
Patient presents with vague, pressure-like, crampy abdominal pain that began yesterday. Can be pain 2/2 multiple retroperitoneal masses seen on CT imaging, but can also due to relative constipation in this patient who normally has diarrhea and now hasn't had a bowel movement in a few days. Can also consider UTI.   - continue to monitor for BMs, consider starting stool softeners   - Tylenol 650mg Q6hrs PRN for moderate pain   - f/u UA

## 2018-03-30 NOTE — DISCHARGE NOTE ADULT - ADDITIONAL INSTRUCTIONS
Please followup with Dr. Voss within a week of discharge. Please followup with Dr. Voss within a week of discharge. You should also followup with Dr. Saba Silver within a week of discharge. Please followup with Dr. Voss within a week of discharge. You should also followup with Dr. Patel (gyn-onc) or Dr. Saba Silver within a week of discharge. Please followup with Dr. Voss within a week of discharge. You have an appointment scheduled with Dr. Saba Silver on 4/9 at 2PM. You may also wish to followup with Dr. Patel (gyn-onc) at [facility].

## 2018-03-30 NOTE — DISCHARGE NOTE ADULT - PLAN OF CARE
Outpatient advance care planning You were seen by the palliative care team and completed a MOLST form in which you indicated that you want to be DNR/DNI and would not want aggressive measures such as pressors and ICU care. You were informed of the extent of your likely malignancy and were seen by oncology for prognosis. You reported abdominal pain likely secondary to constipation and the multiple masses seen on CT imaging concerning for metastasis. You were found to have multiple retroperitoneal masses seen on No changes were made to your medications for asthma. Please followup with your primary care provider for continued management. You were found to have multiple retroperitoneal masses seen on CT imaging of your abdomen and pelvis. You were seen by oncology as well as palliative care. You were told of the extent of your disease and wished for no chemotherapy or heroic interventions. No changes were made to your medications for CAD. Please followup with your primary care provider. No changes were made to your medications for hypertension. Please followup with your primary care provider. No changes were made to your medication for hypothyroidism. Please followup with your primary care provider. You were found to have multiple retroperitoneal masses seen on CT imaging of your abdomen and pelvis. You were seen by gyn-oncology as well as palliative care. You were told of the extent of your disease and wished for no chemotherapy or heroic interventions. You underwent IR guided biopsy of one of your masses, pathology report pending. Please followup with Dr. Cuenca and Dr. Voss for continued management. You reported abdominal pain likely secondary to constipation and the multiple masses seen on CT imaging concerning for metastasis. Your abdominal pain improved after you were placed on a bowel regimen and had a bowel movement. Please consume a diet high in fiber and take stool softeners as needed for constipation. You were found to have multiple retroperitoneal masses seen on CT imaging of your abdomen and pelvis. You were seen by gyn-oncology as well as palliative care. You were told of the extent of your disease and wished for no chemotherapy or heroic interventions. You underwent IR guided biopsy of one of your masses, pathology report pending. You have an appointment scheduled with Dr. Saba Silver on 4/9 at 2PM. Please followup with Dr. Voss as well within a week.

## 2018-03-30 NOTE — H&P ADULT - PROBLEM SELECTOR PLAN 2
CT A/P notable for multiple retroperitoneal masses involving the bilateral kidneys as well as L adnexa, with one mass compressing the IVC posteriorly, another partially encasing the abdominal aorta, and another extending into the L iliopsoas muscle. Concern for metastatic disease from primary uterine cancer from 2011 vs new primary with metastatic disease. Patient aware of current findings and reports previously being told that she continued to have "active" cancer despite surgical and radiation therapy of uterine cancer in 2011; has not had oncological follow up with treatment as she was asymptomatic.   - patient interested in determining etiology of masses and discussing treatment options, oncology consult in AM  - consider palliative consult in AM given extensive disease

## 2018-03-30 NOTE — H&P ADULT - PROBLEM SELECTOR PLAN 7
Recently started on Synthroid, however patient reports noncompliance.   - continue home Synthroid 25mcg QD   - f/u TSH

## 2018-03-30 NOTE — H&P ADULT - NSHPPHYSICALEXAM_GEN_ALL_CORE
Vital Signs Last 24 Hrs  T(C): 36.8 (30 Mar 2018 00:55), Max: 37.1 (29 Mar 2018 23:52)  T(F): 98.2 (30 Mar 2018 00:55), Max: 98.8 (29 Mar 2018 23:52)  HR: 96 (30 Mar 2018 00:55) (96 - 109)  BP: 115/72 (30 Mar 2018 00:55) (101/62 - 117/72)  RR: 16 (30 Mar 2018 00:55) (16 - 18)  SpO2: 95% (30 Mar 2018 00:55) (95% - 96%)    PHYSICAL EXAM:  Constitutional: elderly female, WDWN, NAD   HEENT: NCAT, EOMI, PERRL, dry MM, no oral lesions   Neck: supple, no JVD, no LAD   Pulm: clear to auscultation bilaterally, no wheezing/rales/rhonchi  CV: RRR, +S1S2, no murmurs appreciated   GI: soft, nondistended, tenderness to LLQ > RLQ, no rigidity, no peritoneal signs   Ext: WWP, trace LE edema, no asymmetry, no calf tenderness, DP pulses 2+ bilaterally   Neuro: AAOx3, CNII-XII grossly intact, moves all extremities

## 2018-03-30 NOTE — CONSULT NOTE ADULT - PROBLEM SELECTOR RECOMMENDATION 9
h/o, s/p hysterectomy and RT previously.  Pt reports being told of mets to LNs by her gynecologist ~17 yrs ago, but did not f/u because she didn't want to deal with it then

## 2018-03-30 NOTE — DISCHARGE NOTE ADULT - SECONDARY DIAGNOSIS.
Abdominal pain, unspecified abdominal location Retroperitoneal mass Asthma CAD (coronary artery disease) HTN (hypertension) Hypothyroidism

## 2018-03-30 NOTE — H&P ADULT - PROBLEM SELECTOR PLAN 9
No wheezing on exam.  - continue home Pulmicort BID  - start Duonebs Q6hrs PRN    #Hyponatremia: Na 134, likely 2/2 poor PO intake   - continue NS@70cc/hr    #Hypochloremia: Cl 95, likely 2/2 poor PO intake   - continue NS@70cc/hr

## 2018-03-30 NOTE — CONSULT NOTE ADULT - SUBJECTIVE AND OBJECTIVE BOX
81yo admitted to medicine for abdominal pain, cramping and backpain x 2 days. GYN consult in context of hx of uterine cancer dxn in  with subsequent SARA, BSO, staging followed by radiation. She follows with Dr. Saba Silver yearly but has not seen her in last 2 years. She has no VB. ROS is negative except as outlined above.     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 1000 milliLiter(s) (50 mL/Hr) IV Continuous <Continuous>  dextrose 50% Injectable 12.5 Gram(s) IV Push once  dextrose 50% Injectable 25 Gram(s) IV Push once  dextrose 50% Injectable 25 Gram(s) IV Push once  enoxaparin Injectable 40 milliGRAM(s) SubCutaneous every 24 hours  ergocalciferol 25641 Unit(s) Oral every week  famotidine    Tablet 20 milliGRAM(s) Oral two times a day  insulin lispro (HumaLOG) corrective regimen sliding scale   SubCutaneous Before meals and at bedtime  levothyroxine 25 MICROGram(s) Oral daily  metoprolol succinate ER 50 milliGRAM(s) Oral two times a day  oxybutynin 10 milliGRAM(s) Oral daily  sodium chloride 0.9%. 1000 milliLiter(s) (70 mL/Hr) IV Continuous <Continuous>    MEDICATIONS  (PRN):  acetaminophen   Tablet. 650 milliGRAM(s) Oral every 6 hours PRN Moderate Pain (4 - 6)  dextrose Gel 1 Dose(s) Oral once PRN Blood Glucose LESS THAN 70 milliGRAM(s)/deciliter  glucagon  Injectable 1 milliGRAM(s) IntraMuscular once PRN Glucose LESS THAN 70 milligrams/deciliter  polyethylene glycol 3350 17 Gram(s) Oral two times a day PRN Constipation    PHYSICAL EXAM:  ICU Vital Signs Last 24 Hrs  T(C): 36.8 (30 Mar 2018 21:00), Max: 37.2 (30 Mar 2018 05:08)  T(F): 98.3 (30 Mar 2018 21:00), Max: 99 (30 Mar 2018 05:08)  HR: 89 (30 Mar 2018 21:00) (82 - 104)  BP: 102/68 (30 Mar 2018 21:00) (100/68 - 116/72)  BP(mean): --  ABP: --  ABP(mean): --  RR: 16 (30 Mar 2018 21:00) (16 - 18)  SpO2: 95% (30 Mar 2018 21:00) (95% - 98%)    Gen: NAD  Abd: soft, obese, non-tender, mildly distended   : no cva tenderness  Pelvic: declined - no sex since    LE: No LE swelling    POCT Blood Glucose.: 122 mg/dL (30 Mar 2018 12:28)    I&O's Detail      LABS:    CBC:                        11.1   9.6   )-----------( 236      ( 30 Mar 2018 07:08 )             33.6     CMP:        134<L>  |  99  |  14  ----------------------------<  112<H>  4.0   |  23  |  0.92    Ca    9.8      30 Mar 2018 07:08  Mg     1.6         TPro  8.5<H>  /  Alb  4.1  /  TBili  0.7  /  DBili  x   /  AST  20  /  ALT  10  /  AlkPhos  90      Urinalysis Basic - ( 30 Mar 2018 06:46 )  Color: Yellow / Appearance: Clear / S.010 / pH: x  Gluc: x / Ketone: NEGATIVE  / Bili: Negative / Urobili: 1.0 E.U./dL   Blood: x / Protein: NEGATIVE mg/dL / Nitrite: NEGATIVE   Leuk Esterase: NEGATIVE / RBC: < 5 /HPF / WBC < 5 /HPF   Sq Epi: x / Non Sq Epi: 0-5 /HPF / Bacteria: Present /HPF      < from: CT Abdomen and Pelvis w/ Oral Cont (18 @ 21:31) >  EXAM:  CT ABDOMEN AND PELVIS OC                          PROCEDURE DATE:  2018                     INTERPRETATION:  CT of the ABDOMEN and PELVIS without intravenous   contrast dated 3/29/2018 9:31 PM    INDICATION: Lower abdominal pain (left lower quadrant greater than right   lower quadrant) and diarrhea.    TECHNIQUE: CT of the abdomen and pelvis without intravenous contrast.   Oral contrast was administered as per the ordering physician. Axial,   sagittal, and coronal images were obtained and reviewed.    COMPARISON: CTA dissection protocol 2013.    FINDINGS:    Evaluation of the abdominopelvic viscera is limited due to noncontrast   technique.    Lower chest: Bibasilar dependent atelectasis is noted. Dense mitral   annular calcifications versus valve replacement.     Liver: Smooth in contour. No focal mass.     Biliary system: The patient is noted to be status post cholecystectomy.   No biliary ductal dilatation.    Pancreas: There is fatty atrophy of the pancreas.    Spleen: Unremarkable.    Adrenal glands: Unremarkable.    Kidneys: No hydronephrosis. No renal or ureteral calculus.     Urinary Bladder: Unremarkable.    Reproductive organs: The patient is noted to be status post hysterectomy.   There is a soft tissue mass within the region of the left adnexa   measuring 3.8 x 1.5 x 1.4 cm. This may represent an ovarian mass versus   retroperitoneal soft tissue mass.    Bowel/Peritoneum: There is a small type I hiatal hernia. The bowel is   normal in caliber without evidence of obstruction. No appreciable wall   thickening. There is mild colonic diverticulosis without evidence of   acute diverticulitis. Normal appendix. No ascites or extraluminal gas.     Lymph nodes: No lymphadenopathy.    Aorta/IVC: Normal caliber. Mild calcified atheromatous plaque is seen   within the visualized aorta and its major branches.    Abdominal wall: No hernia.    Soft tissues: There are bilateral bulky retroperitoneal masses with soft   tissue density at the level of the kidneys. The mass to the right midline   measures 4.6 x 3.9 x 6.6 cm, and is interposed between the right kidney   and the aorta and is compressing the IVC posteriorly. The contralateral   mass to the left of midline measures at least 6.8 x 5.6 x 9.5 cm. Areas   of low-attenuation along the lateral aspect of the soft tissue mass are   suggestive for intrinsic areas of necrosis. This mass demonstrates   intrinsic calcifications, and appears to partially encase the abdominal   aorta. The mass also extends into the left iliopsoas muscle which is   asymmetrically enlarged. It is difficult to say whether not the mass is   intrinsic or extrinsic to the left iliopsoas muscle. The soft tissue mass   extends inferiorly past the bifurcation to the level of the common iliac   arteries. There is a small 8 mm soft tissue nodule within the mesenteric   fat of the right lower quadrant. There is a moderate amount of fat   stranding surrounding these masses. There is an additional soft tissue   mass within theleft lower quadrant, subjacent to the left rectus   abdominis muscle, measuring 3.7 x 2.1 x 2.5 cm.    Bones: No acute abnormality.  There are moderate to severe degenerative   changes of the spine. No lytic or blastic osseous lesions are identified.      IMPRESSION:   1.  There are soft tissue retroperitoneal and peritoneal soft masses as   above. The larger mass along the left retroperitoneal region appears to   encase the aorta and contact the left iliopsoas muscle which is   asymmetrically enlarged. Its difficult to definitively say whether the   mass is extrinsic or intrinsic to the left iliopsoas muscle.   Constellation of findings are consistent with a malignant process.   Differentials include but are not limited to the following: Possible   sarcoma originating from the left iliopsoas muscle or metastatic   conglomerate retroperitoneal adenopathy. Recommend nonemergent abdominal   MRI and/or PET/CT for further characterization.  2.  In addition to this there is a prominent soft tissue within the   region of the left adnexa, cannot exclude a left adnexal mass. Consider   further evaluation with contrast MRI and/or oral pelvic sonogram is   recommended.  3.  No acute intra-abdominal findings.  4.  Ancillary findings as above.            "Thank you for the opportunity to participate in the care of this   patient."    ESTELLE HARDY M.D., RADIOLOGY RESIDENT  This document has been electronically signed.  STEPHANIE PITTS M.D., ATTENDING RADIOLOGIST  This document has been electronically signed. Mar 29 2018 10:46PM    < end of copied text >
HPI:82 year old Ms Ximena whyte,was admitted here for abdominal pain & diarrhoea & was found to have recurrent metastatic uterine carcinoma,after 16 years.She is well known to me & was being followed by me,has H/O HASHD with non obstructve CAD,Bronchial Asthma,h/o obsessive compulsive disorder,S/P total abdominal hysterectomy & bilateral Salpingo-oophorectomy in ,for uterine carcinoma.,has diabetes mellitus& osteoarthritis of both Hips,knees & lowerback & had androgenic alopecia.    Age at Dx:  How dx:  Hx and duration of insulin:  Current Therapy:  Hx of hypoglycemia  Hx of DKA/HHS?    Home FSG:  Fasting  Lunch  Dinner  Bed    Hx of other regimens  Complications:  Outpatient Endo:    PMH & Surgical Hx:MALIGNANCY  No h/o HF  Yes  No pertinent family history in first degree relatives  Handoff  MEWS Score  Asthma  Uterine cancer  Prediabetes  CAD (coronary artery disease)  Hypothyroidism  HTN (hypertension)  GERD (gastroesophageal reflux disease)  HTN (hypertension)  GERD (gastroesophageal reflux disease)  Advance care planning  Abdominal pain, unspecified abdominal location  Malignancy  Advance care planning  Palliative care by specialist  Malignant neoplasm of uterus, unspecified site  Nutrition, metabolism, and development symptoms  Asthma  GERD (gastroesophageal reflux disease)  Hypothyroidism  Prediabetes  CAD (coronary artery disease)  HTN (hypertension)  Uterine cancer  Retroperitoneal mass  Abdominal pain, unspecified abdominal location  H/O total hysterectomy  History of cholecystectomy  ABD PAIN  5  HTN (hypertension)  CAD (coronary artery disease)  Asthma  Retroperitoneal mass  Abdominal pain, unspecified abdominal location  Retroperitoneal mass  Hypothyroidism  GERD (gastroesophageal reflux disease)  CAD (coronary artery disease)  Asthma  Advance care planning  Abdominal pain, unspecified abdominal location       FH:  DM:  Thyroid:  Autoimmune:  Other:    SH:  Smoking:    Etoh:   Recreational Drugs:  Social Life:    Current Meds:  acetaminophen   Tablet. 650 milliGRAM(s) Oral every 6 hours PRN  atorvastatin 20 milliGRAM(s) Oral at bedtime  buDESOnide  180 MICROgram(s) Inhaler 1 Puff(s) Inhalation two times a day  clopidogrel Tablet 75 milliGRAM(s) Oral daily  dextrose 5%. 1000 milliLiter(s) IV Continuous <Continuous>  dextrose 50% Injectable 12.5 Gram(s) IV Push once  dextrose 50% Injectable 25 Gram(s) IV Push once  dextrose 50% Injectable 25 Gram(s) IV Push once  dextrose Gel 1 Dose(s) Oral once PRN  enoxaparin Injectable 40 milliGRAM(s) SubCutaneous every 24 hours  ergocalciferol 90360 Unit(s) Oral every week  famotidine    Tablet 20 milliGRAM(s) Oral two times a day  glucagon  Injectable 1 milliGRAM(s) IntraMuscular once PRN  insulin lispro (HumaLOG) corrective regimen sliding scale   SubCutaneous Before meals and at bedtime  levothyroxine 25 MICROGram(s) Oral daily  metoprolol succinate ER 50 milliGRAM(s) Oral two times a day  oxybutynin 10 milliGRAM(s) Oral daily  polyethylene glycol 3350 17 Gram(s) Oral two times a day PRN      Allergies:  codeine (Other)  erythromycin (Rash)  influenza virus vaccine, inactivated (Hives)  Originally Entered as [Hives] reaction to [Achromycin] (Hives)  tetracycline (Rash)  tetracyclines (Rash)      ROS:  Denies the following except as indicated.    General: weight loss/weight gain, decreased appetite, fatigue  Eyes: Blurry vision, double vision, visual changes  ENT: Throat pain, changes in voice,   CV: palpitations, SOB, CP, cough  GI: NVD, difficulty swallowing, abdominal pain  : polyuria, dysuria  Endo: abnormal menses, temperature intolerance, decreased libido  MSK: weakness, joint pain  Skin: rash, dryness, diaphoresis  Heme: Easy bruising,bleeding  Neuro: HA, dizziness, lightheadedness, numbness tingling  Psych: Anxiety, Depression          Height (cm): 160.02 ( @ 00:55)  Weight (kg): 72.1 ( @ 18:20)  BMI (kg/m2): 28.2 ( @ 00:55)    Vital Signs Last 24 Hrs  T(C): 36.8 (30 Mar 2018 18:47), Max: 37.2 (30 Mar 2018 05:08)  T(F): 98.2 (30 Mar 2018 18:47), Max: 99 (30 Mar 2018 05:08)  HR: 94 (30 Mar 2018 18:47) (82 - 104)  BP: 114/79 (30 Mar 2018 18:47) (100/68 - 116/72)  BP(mean): --  RR: 18 (30 Mar 2018 18:47) (16 - 18)  SpO2: 95% (30 Mar 2018 18:47) (95% - 98%)  Constitutional: wn/wd in NAD.   HEENT: NCAT, MMM, OP clear, EOMI, , no proptosis or lid retraction  Neck: no thyromegaly or palpable thyroid nodules   Respiratory: lungs CTAB.  Cardiovascular: regular rhythm, normal S1 and S2, no audible murmurs, no peripheral edema  GI: soft, NT/ND, no masses/HSM appreciated.  Neurology: no tremors, DTR 2+  Skin: no visible rashes/lesions  Psychiatric: AAO x 3, normal affect/mood.  Ext: radial pulses intact, DP pulses intact, extremities warm, no cyanosis, clubbing or edema.       LABS:                        11.1   9.6   )-----------( 236      ( 30 Mar 2018 07:08 )             33.6     03-30    134<L>  |  99  |  14  ----------------------------<  112<H>  4.0   |  23  |  0.92    Ca    9.8      30 Mar 2018 07:08  Mg     1.6         TPro  8.5<H>  /  Alb  4.1  /  TBili  0.7  /  DBili  x   /  AST  20  /  ALT  10  /  AlkPhos  90        Urinalysis Basic - ( 30 Mar 2018 06:46 )    Color: Yellow / Appearance: Clear / S.010 / pH: x  Gluc: x / Ketone: NEGATIVE  / Bili: Negative / Urobili: 1.0 E.U./dL   Blood: x / Protein: NEGATIVE mg/dL / Nitrite: NEGATIVE   Leuk Esterase: NEGATIVE / RBC: < 5 /HPF / WBC < 5 /HPF   Sq Epi: x / Non Sq Epi: 0-5 /HPF / Bacteria: Present /HPF      Hemoglobin A1C, Whole Blood: 5.4 ( @ 07:08)    Thyroid Stimulating Hormone, Serum: 2.599 ( @ 07:08)      RADIOLOGY & ADDITIONAL STUDIES:    A/P:82y Female    1.  DM  Please continue  lispro moderate / low dose sliding scale four times daily with meals and at bedtime    Pt's fasting glucose and pre-meal goal is 100mg/dl  & premeal of < 180mg/dl    Will continue to monitor     For discharge, pt can continue
LAINE CHAUDHARI          MRN-[id number]           : 1935    HPI:  82F with PMHx of HTN, non-obstructive CAD (last angiogram in 2017, no hx of stents), preDM, hypothyroidism, hx of uterine cancer s/p hysterectomy and radiation therapy in  (followed previously by Dr. Saba Silver, reports being told at that time that she has evidence of metastatic disease to lymph nodes and "bloodstream" but hasn't had oncologic treatment) who presents with complaints of new abdominal pressure, cramp-like pain in the LLQ >RLQ since yesterday. Patient reports history of chronic diarrhea since the  after reportedly returning from a trip to [place], described as multiple watery/loose brown bowel movements daily without blood, recently began taking Imodium to help with the diarrhea. Last Imodium use this week, unsure which day, hasn't had a BM since then. Patient endorses recent weight loss, intentional and unintentional, associated with poor appetite and exercising. Denies fevers, chills, diaphoresis, CP, SOB, nausea/vomiting, dysuria.   Patient reports having a history of uterine cancer that was treated. However, at the time of surgery and radiation therapy, patient recalls being told by oncologist that her cancer had spread to lymph nodes and "the bloodstream". Initially had regular follow up with oncologist were she states that she was always told she has "active" cancer despite intervention on uterus. Last follow up with oncologist was 2 years ago. Last colonoscopy x4yrs ago, was told to return in 5yrs. Last mammogram 3yrs ago, reports possible mass near nipple, though unsure.     In the ED, patient afebrile, -109, -117/62-72, RR 18, SpO2 96% on RA. Given abdominal pain, underwent CT A/P which shows: Soft tissue mass within the region of the left adnexa measuring 3.8 x 1.5 x 1.4 cm, which may represent an ovarian mass versus retroperitoneal soft tissue mass; bilateral bulky retroperitoneal masses with soft tissue density at the level of the kidneys with mass to the right midline measuring 4.6 x 3.9 x 6.6 cm and interposed between the right kidney and the aorta and is compressing the IVC posteriorly, contralateral mass to the left of midline measures at least 6.8 x 5.6 x 9.5 cm, areas of low-attenuation along the lateral aspect of the soft tissue mass are suggestive for intrinsic areas of necrosis. This mass demonstrates intrinsic calcifications, and appears to partially encase the abdominal aorta. The mass also extends into the left iliopsoas muscle which is asymmetrically enlarged. It is difficult to say whether not the mass is intrinsic or extrinsic to the left iliopsoas muscle. The soft tissue mass extends inferiorly past the bifurcation to the level of the common iliac arteries. There is a small 8 mm soft tissue nodule within the mesenteric fat of the right lower quadrant. There is a moderate amount of fat stranding surrounding these masses. There is an additional soft tissue mass within the left lower quadrant, subjacent to the left rectus abdominis muscle, measuring 3.7 x 2.1 x 2.5 cm. Patient was informed of CT findings by ED provider, admitted for oncology consult in AM and possible malignancy workup. 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, ex-smoker,  ~5 cigarettes daily x40 yrs, quite ~3months ago    ROS:    Unable to attain due to:                      DYSPNEA (Y/N):	n  N/V (Y/N): n	  SECRETIONS (Y/N): n	  AGITATION (Y/N): n  PAIN(Y/N): y, abd and chronic LBP      -Provocation/Palliation: none/rubbing abd, magnet on her back     -Quality/Quantity: crampy like a period     -Radiating: none     -Severity: crampy     -Timing/Frequency: intermittent throughout day     -Impact on ADLs: minor "knows it's there but don't feel she needs to take anything for it"    GENERAL: Wants to know the extent of her cancer so she can better plan and go home  HEENT: has macular degeneration  NECK: denies   CVS:  denies   RESP:  denies   GI: chronic intermittent diarrhea since the  post trip from [place]   : incont. since procedure from gynecologist who told her she had cancer since    MUSC: chronic LBP since childhood (14 y/o) and wears a magnet constantly to control pain which is generally adequate, legs sometimes swell  NEURO: denies   PSYCH:  denies.  Feels she was once in denial when told of her cancer >17 yrs ago, but is now accepting and is "ready to die and let go, but just wants to know full extent/progression of cancer and how much time I have because my cancer is wrapped around my aorta"  SKIN: denies   LYMPH: denies     Allergies    codeine (Other)  erythromycin (Rash)  influenza virus vaccine, inactivated (Hives)  Originally Entered as [Hives] reaction to [Achromycin] (Hives)  tetracycline (Rash)  tetracyclines (Rash)    Intolerances    OPIATE NAIVE (Y/N): y  -iStop reviewed (Y/N): Y, Ref#[id number]      MEDICATIONS:      MEDICATIONS  (STANDING):  atorvastatin 20 milliGRAM(s) Oral at bedtime  buDESOnide  180 MICROgram(s) Inhaler 1 Puff(s) Inhalation two times a day  clopidogrel Tablet 75 milliGRAM(s) Oral daily  dextrose 5%. 1000 milliLiter(s) (50 mL/Hr) IV Continuous <Continuous>  dextrose 50% Injectable 12.5 Gram(s) IV Push once  dextrose 50% Injectable 25 Gram(s) IV Push once  dextrose 50% Injectable 25 Gram(s) IV Push once  enoxaparin Injectable 40 milliGRAM(s) SubCutaneous every 24 hours  ergocalciferol 06300 Unit(s) Oral every week  famotidine    Tablet 20 milliGRAM(s) Oral two times a day  insulin lispro (HumaLOG) corrective regimen sliding scale   SubCutaneous Before meals and at bedtime  levothyroxine 25 MICROGram(s) Oral daily  metoprolol succinate ER 50 milliGRAM(s) Oral two times a day  oxybutynin 10 milliGRAM(s) Oral daily  sodium chloride 0.9%. 1000 milliLiter(s) (70 mL/Hr) IV Continuous <Continuous>    MEDICATIONS  (PRN):  acetaminophen   Tablet. 650 milliGRAM(s) Oral every 6 hours PRN Moderate Pain (4 - 6)  dextrose Gel 1 Dose(s) Oral once PRN Blood Glucose LESS THAN 70 milliGRAM(s)/deciliter  glucagon  Injectable 1 milliGRAM(s) IntraMuscular once PRN Glucose LESS THAN 70 milligrams/deciliter  polyethylene glycol 3350 17 Gram(s) Oral two times a day PRN Constipation    LABS:    CBC:                        11.1   9.6   )-----------( 236      ( 30 Mar 2018 07:08 )             33.6     CMP:        134<L>  |  99  |  14  ----------------------------<  112<H>  4.0   |  23  |  0.92    Ca    9.8      30 Mar 2018 07:08  Mg     1.6         TPro  8.5<H>  /  Alb  4.1  /  TBili  0.7  /  DBili  x   /  AST  20  /  ALT  10  /  AlkPhos  90      Urinalysis Basic - ( 30 Mar 2018 06:46 )  Color: Yellow / Appearance: Clear / S.010 / pH: x  Gluc: x / Ketone: NEGATIVE  / Bili: Negative / Urobili: 1.0 E.U./dL   Blood: x / Protein: NEGATIVE mg/dL / Nitrite: NEGATIVE   Leuk Esterase: NEGATIVE / RBC: < 5 /HPF / WBC < 5 /HPF   Sq Epi: x / Non Sq Epi: 0-5 /HPF / Bacteria: Present /HPF    IMAGING:  Reviewed  < from: CT Abdomen and Pelvis w/ Oral Cont (18 @ 21:31) >  EXAM:  CT ABDOMEN AND PELVIS OC                        PROCEDURE DATE:  2018    < from: CT Abdomen and Pelvis w/ Oral Cont (18 @ 21:31) >  IMPRESSION:   1.  There are soft tissue retroperitoneal and peritoneal soft masses as   above. The larger mass along the left retroperitoneal region appears to   encase the aorta and contact the left iliopsoas muscle which is   asymmetrically enlarged. Its difficult to definitively say whether the   mass is extrinsic or intrinsic to the left iliopsoas muscle.   Constellation of findings are consistent with a malignant process.   Differentials include but are not limited to the following: Possible   sarcoma originating from the left iliopsoas muscle or metastatic   conglomerate retroperitoneal adenopathy. Recommend nonemergent abdominal   MRI and/or PET/CT for further characterization.  2.  In addition to this there is a prominent soft tissue within the   region of the left adnexa, cannot exclude a left adnexal mass. Consider   further evaluation with contrast MRI and/or oral pelvic sonogram is   recommended.  3.  No acute intra-abdominal findings.  4.  Ancillary findings as above.    < end of copied text >    PHYSICAL EXAM:  T(C): 37.2 (18 @ 05:08), Max: 37.2 (18 @ 05:08)  HR: 82 (18 @ 05:08) (82 - 109)  BP: 116/72 (18 @ 05:08) (101/62 - 117/72)  RR: 16 (18 @ 05:08) (16 - 18)  SpO2: 95% (18 @ 05:08) (95% - 96%)  Wt(kg): --  Daily Height in cm: 160.02 (30 Mar 2018 00:55)    Daily Weight in k.1 (30 Mar 2018 00:55)  CAPILLARY BLOOD GLUCOSE    POCT Blood Glucose.: 122 mg/dL (30 Mar 2018 12:28)    I&O's Summary    GENERAL: Pleasant elderly woman open with her feelings and concerns  HEENT: atraumatic, anicteric, hearing appears intact, no nasal discharge, dry mucous membranes     NECK: supple   CVS: nl S1S2, no M/RG appreciated   RESP: RA, resp even and not labored   GI: +BS, softly distended, NT to light palp    : incont, no CVA tenderness    MUSC: trace left ankle edema, no contractures  NEURO: no focal deficits  PSYCH: not depressed, appropriate and pleasant  SKIN: no rash  LYMPH: no supraclavicular LAD   PREADMIT Karnofsky: 90%           CURRENT Karnofsky: 80-90%  CACHEXIA (Y/N): n  BMI: 28.2    ADVANCE DIRECTIVES:  DNR/DNI    MOLST-done today     Decision maker: pt  Legal surrogate: brother Devon ([phone])    PSYCHOSOCIAL-SPIRITUAL ASSESSMENT:  Reviewed  Care plan unchanged  Care plan adjusted as below	    GOALS OF CARE DISCUSSION:  Palliative care info/counseling provided	      Advanced Directives addressed please see Advance Care Planning Note	      Documentation of GOC: wants to be seen by Heme/Onc for more prognostic info., not likely to consider treatment, then home ASAP    AGENCY CHOICE DISCUSSED:  none          REFERRALS:	   Unit SW/Case Mgmt  -declined  Speech/Swallow  Massage Therapy  Music Therapy  Nutrition

## 2018-03-30 NOTE — DISCHARGE NOTE ADULT - MEDICATION SUMMARY - MEDICATIONS TO TAKE
I will START or STAY ON the medications listed below when I get home from the hospital:    Pulmicort Flexhaler 180 mcg/inh inhalation powder  -- 1 puff(s) inhaled 2 times a day  -- Indication: For Asthma    spironolactone 25 mg oral tablet  -- 1 tab(s) by mouth 3 times a day  -- Indication: For Alopecia    isosorbide mononitrate 30 mg oral tablet, extended release  -- 1 tab(s) by mouth once a day (at bedtime)  -- Indication: For HTN (hypertension)    Lipitor 20 mg oral tablet  -- 1 tab(s) by mouth once a day  -- Indication: For HLD    Plavix 75 mg oral tablet  -- 1 tab(s) by mouth once a day  -- Indication: For CAD (coronary artery disease)    metoprolol succinate 50 mg oral tablet, extended release  -- 1 tab(s) by mouth 2 times a day  -- Indication: For HTN (hypertension)    albuterol 90 mcg/inh inhalation aerosol  -- 2 puff(s) inhaled 3 times a day  -- Indication: For Asthma    raNITIdine 300 mg oral tablet  -- 1 tab(s) by mouth 2 times a day  -- Indication: For GERD (gastroesophageal reflux disease)    levothyroxine 25 mcg (0.025 mg) oral tablet  -- 1 tab(s) by mouth once a day  -- Indication: For Hypothyroidism    Ditropan XL 10 mg/24 hr oral tablet, extended release  -- 1 tab(s) by mouth 2 times a day  -- Indication: For Bladder spasms    Vitamin D3 50,000 intl units oral capsule  -- 1 cap(s) by mouth once a week  -- Indication: For Nutrition, metabolism, and development symptoms

## 2018-03-30 NOTE — H&P ADULT - ASSESSMENT
82F with PMHx of HTN, non-obstructive CAD (last angiogram in 2017, no hx of stents), preDM, hypothyroidism, hx of uterine cancer s/p hysterectomy and radiation therapy in 2011 (followed previously by Dr. Saba Silver, reports being told at that time that she has evidence of metastatic disease to lymph nodes and "bloodstream" but hasn't had oncologic treatment) who presents with complaints of new abdominal pressure, cramp-like pain in the LLQ >RLQ since yesterday, found to have extensive retroperitoneal masses consistent with metastatic disease, admitted for oncological workup.

## 2018-03-30 NOTE — H&P ADULT - ATTENDING COMMENTS
dx  adnexal and retroperitoneal masses - concern for uterine cancer , given hx of. pt expresses that she would NOT want chemo if this does turn out to be cancer. onc to eval.   abd pain- due to adenexal/RP masses - tumors. Tylenol prn  hypercalcemia - suspect due to malignancy. resolved w/ IVFs.   CAD - stable. no CP.   chronic asthma - stable. no wheeze . pulmicort. albuterol prn.   htn- cont metoprolol. BPs at goal dx  adnexal and retroperitoneal masses - concern for uterine cancer , given hx of. pt expresses that she would NOT want chemo if this does turn out to be cancer. onc to eval.   abd pain- due to adenexal/RP masses - tumors. Tylenol prn  hypercalcemia - suspect due to malignancy. resolved w/ IVFs.   CAD - stable. no CP.   left lower ext edema - check duplex  chronic asthma - stable. no wheeze . pulmicort. albuterol prn.   htn- cont metoprolol. BPs at goal

## 2018-03-30 NOTE — DISCHARGE NOTE ADULT - CARE PROVIDER_API CALL
Shanika, Amar L (MD), EndocrinologyMetabDiabetes; Internal Medicine  [address], [place] [address]  Phone: [phone]  Fax: [phone] Shanika, Amar L (MD), EndocrinologyMetabDiabetes; Internal Medicine  [address]  [address] [place] [address]  Phone: [phone]  Fax: [phone]    Saba Silver  [address], [address] [place] [address]  Phone: [phone]  Fax: (    Shanika, Amar L (MD), EndocrinologyMetabDiabetes; Internal Medicine  [address]  [address]  [place] [address]  Phone: [phone]  Fax: [phone]    Saba Silver  [address], [address]  [place] [address]  Phone: [phone]  Fax: (   )    -    Alisa Patel (DO), Gynecologic Oncology; Obstetrics and Gynecology  [address] [facility]  [address] [place] [address]  Phone: [phone]  Fax: [phone]

## 2018-03-30 NOTE — CONSULT NOTE ADULT - ASSESSMENT
83 y/o female with h/o CAD, HTN, hypothyroidism, uterine ca s/p hysterectomy and RT in 2001, told by Gynecologist then she had metastatic disease to LNs then, but did not f/u, chronic diarrhea, GERD, asthma, prediabetes, and choleycystectomy, here with abd pain with no BM in 4-5 days after taking immodium for her chronic diarrhea found to have likely progression of mets, seen for goals of care
83 y/o with h/o uterine cancer s/p SARA, BSO, staging and radiation in 2001. Pt unsure of stage. She has a mass encompassing the aorta. Uterine cancer recurrence typically happens at the cuff and with an average of about 3-5 years. This is unlikely to be GYN origin as her original surgery was 17years ago and mass is not local. This may be a non-gyn related malignancy but given hx, should be worked up. She has an GYN Oncologist that has followed her - Dr. Saba Silver whom the patient has chosen to follow up with after this admission. Pt offered option to follow up with our faculty GYN Oncologist outpt as well. No further recommendation for inpt management. Pt can have additional work-up more appropriately done in the outpt setting.
agree with SOAP & with plan of discharge & follow up,dicussed at length with Gyn consultant.

## 2018-03-30 NOTE — H&P ADULT - NSHPSOCIALHISTORY_GEN_ALL_CORE
Denies alcohol use. Former smoker, 5 cigarettes daily x >40yrs ago. Denies illicit drug use. Lives alone. Ambulates with cane.

## 2018-03-30 NOTE — H&P ADULT - HISTORY OF PRESENT ILLNESS
82F with PMHx of HTN, non-obstructive CAD (last angiogram in 2017, no hx of stents), preDM, hypothyroidism, hx of uterine cancer s/p hysterectomy and radiation therapy in 2011 (followed previously by Dr. Saba Silver, reports being told at that time that she has evidence of metastatic disease to lymph nodes and "bloodstream" but hasn't had oncologic treatment) who presents with complaints of new abdominal pressure, cramp-like pain in the LLQ >RLQ since yesterday. Patient reports history of chronic diarrhea since the 1990s after reportedly returning from a trip to [place], described as multiple watery/loose brown bowel movements daily without blood, recently began taking Imodium to help with the diarrhea. Last Imodium use this week, unsure which day, hasn't had a BM since then. Patient endorses recent weight loss, intentional and unintentional, associated with poor appetite and exercising. Denies fevers, chills, diaphoresis, CP, SOB, nausea/vomiting, dysuria.   Patient reports having a history of uterine cancer that was treated. However, at the time of surgery and radiation therapy, patient recalls being told by oncologist that her cancer had spread to lymph nodes and "the bloodstream". Initially had regular follow up with oncologist were she states that she was always told she has "active" cancer despite intervention on uterus. Last follow up with oncologist was 2 years ago. Last colonoscopy x4yrs ago, was told to return in 5yrs. Last mammogram 3yrs ago, reports possible mass near nipple, though unsure.     In the ED, patient afebrile, -109, -117/62-72, RR 18, SpO2 96% on RA. Given abdominal pain, underwent CT A/P which shows: Soft tissue mass within the region of the left adnexa measuring 3.8 x 1.5 x 1.4 cm, which may represent an ovarian mass versus retroperitoneal soft tissue mass; bilateral bulky retroperitoneal masses with soft tissue density at the level of the kidneys with mass to the right midline measuring 4.6 x 3.9 x 6.6 cm and interposed between the right kidney and the aorta and is compressing the IVC posteriorly, contralateral mass to the left of midline measures at least 6.8 x 5.6 x 9.5 cm, areas of low-attenuation along the lateral aspect of the soft tissue mass are suggestive for intrinsic areas of necrosis. This mass demonstrates intrinsic calcifications, and appears to partially encase the abdominal aorta. The mass also extends into the left iliopsoas muscle which is asymmetrically enlarged. It is difficult to say whether not the mass is intrinsic or extrinsic to the left iliopsoas muscle. The soft tissue mass extends inferiorly past the bifurcation to the level of the common iliac arteries. There is a small 8 mm soft tissue nodule within the mesenteric fat of the right lower quadrant. There is a moderate amount of fat stranding surrounding these masses. There is an additional soft tissue mass within the left lower quadrant, subjacent to the left rectus abdominis muscle, measuring 3.7 x 2.1 x 2.5 cm. Patient was informed of CT findings by ED provider, admitted for oncology consult in AM and possible malignancy workup. 82F with PMHx of HTN, non-obstructive CAD (last angiogram in 2017, no hx of stents), preDM, hypothyroidism, hx of uterine cancer s/p hysterectomy and radiation therapy in 2001 (followed previously by Dr. Saba Silver, reports being told at that time that she has evidence of metastatic disease to lymph nodes and "bloodstream" but hasn't had oncologic treatment) who presents with complaints of new abdominal pressure, cramp-like pain in the LLQ >RLQ since yesterday. Patient reports history of chronic diarrhea since the 1990s after reportedly returning from a trip to [place], described as multiple watery/loose brown bowel movements daily without blood, recently began taking Imodium to help with the diarrhea. Last Imodium use this week, unsure which day, hasn't had a BM since then. Patient endorses recent weight loss, intentional and unintentional, associated with poor appetite and exercising. Denies fevers, chills, diaphoresis, CP, SOB, nausea/vomiting, dysuria.   Patient reports having a history of uterine cancer that was treated. However, at the time of surgery and radiation therapy, patient recalls being told by oncologist that her cancer had spread to lymph nodes and "the bloodstream". Initially had regular follow up with oncologist were she states that she was always told she has "active" cancer despite intervention on uterus. Last follow up with oncologist was 2 years ago. Last colonoscopy x4yrs ago, was told to return in 5yrs. Last mammogram 3yrs ago, reports possible mass near nipple, though unsure.     In the ED, patient afebrile, -109, -117/62-72, RR 18, SpO2 96% on RA. Given abdominal pain, underwent CT A/P which shows: Soft tissue mass within the region of the left adnexa measuring 3.8 x 1.5 x 1.4 cm, which may represent an ovarian mass versus retroperitoneal soft tissue mass; bilateral bulky retroperitoneal masses with soft tissue density at the level of the kidneys with mass to the right midline measuring 4.6 x 3.9 x 6.6 cm and interposed between the right kidney and the aorta and is compressing the IVC posteriorly, contralateral mass to the left of midline measures at least 6.8 x 5.6 x 9.5 cm, areas of low-attenuation along the lateral aspect of the soft tissue mass are suggestive for intrinsic areas of necrosis. This mass demonstrates intrinsic calcifications, and appears to partially encase the abdominal aorta. The mass also extends into the left iliopsoas muscle which is asymmetrically enlarged. It is difficult to say whether not the mass is intrinsic or extrinsic to the left iliopsoas muscle. The soft tissue mass extends inferiorly past the bifurcation to the level of the common iliac arteries. There is a small 8 mm soft tissue nodule within the mesenteric fat of the right lower quadrant. There is a moderate amount of fat stranding surrounding these masses. There is an additional soft tissue mass within the left lower quadrant, subjacent to the left rectus abdominis muscle, measuring 3.7 x 2.1 x 2.5 cm. Patient was informed of CT findings by ED provider, admitted for oncology consult in AM and possible malignancy workup.

## 2018-03-30 NOTE — H&P ADULT - NSHPLABSRESULTS_GEN_ALL_CORE
13.2   13.0  )-----------( 312      ( 29 Mar 2018 19:24 )             38.0     134<L>  |  95<L>  |  15  ----------------------------<  147<H>  4.1   |  22  |  1.03    Ca    11.3<H>      Mg     1.7         TPro  8.5<H>  /  Alb  4.1  /  TBili  0.7  /  DBili  x   /  AST  20  /  ALT  10  /  AlkPhos  90     < from: CT Abdomen and Pelvis w/ Oral Cont (03.29.18 @ 21:31) >    FINDINGS:    Evaluation of the abdominopelvic viscera is limited due to noncontrast technique.    Lower chest: Bibasilar dependent atelectasis is noted. Dense mitral annular calcifications versus valve replacement.     Liver: Smooth in contour. No focal mass.     Biliary system: The patient is noted to be status post cholecystectomy. No biliary ductal dilatation.    Pancreas: There is fatty atrophy of the pancreas.    Spleen: Unremarkable.    Adrenal glands: Unremarkable.    Kidneys: No hydronephrosis. No renal or ureteral calculus.     Urinary Bladder: Unremarkable.    Reproductive organs: The patient is noted to be status post hysterectomy. There is a soft tissue mass within the region of the left adnexa measuring 3.8 x 1.5 x 1.4 cm. This may represent an ovarian mass versus retroperitoneal soft tissue mass.    Bowel/Peritoneum: There is a small type I hiatal hernia. The bowel is normal in caliber without evidence of obstruction. No appreciable wall thickening. There is mild colonic diverticulosis without evidence of acute diverticulitis. Normal appendix. No ascites or extraluminal gas.     Lymph nodes: No lymphadenopathy.    Aorta/IVC: Normal caliber. Mild calcified atheromatous plaque is seen within the visualized aorta and its major branches.    Abdominal wall: No hernia.    Soft tissues: There are bilateral bulky retroperitoneal masses with soft tissue density at the level of the kidneys. The mass to the right midline measures 4.6 x 3.9 x 6.6 cm, and is interposed between the right kidney and the aorta and is compressing the IVC posteriorly. The contralateral mass to the left of midline measures at least 6.8 x 5.6 x 9.5 cm. Areas of low-attenuation along the lateral aspect of the soft tissue mass are   suggestive for intrinsic areas of necrosis. This mass demonstrates intrinsic calcifications, and appears to partially encase the abdominal aorta. The mass also extends into the left iliopsoas muscle which is asymmetrically enlarged. It is difficult to say whether not the mass is intrinsic or extrinsic to the left iliopsoas muscle. The soft tissue mass   extends inferiorly past the bifurcation to the level of the common iliac arteries. There is a small 8 mm soft tissue nodule within the mesenteric fat of the right lower quadrant. There is a moderate amount of fat stranding surrounding these masses. There is an additional soft tissue mass within the left lower quadrant, subjacent to the left rectus   abdominis muscle, measuring 3.7 x 2.1 x 2.5 cm.    Bones: No acute abnormality.  There are moderate to severe degenerative changes of the spine. No lytic or blastic osseous lesions are identified.    < end of copied text >

## 2018-03-30 NOTE — H&P ADULT - PROBLEM SELECTOR PLAN 5
Reports nonobstructive coronary disease, s/p angiogram in 2017 without stent placement. No hx of MI. No current CP.   - continue Toprol XL 50mg BID   - continue Lipitor 20mg QHS Reports nonobstructive coronary disease, s/p angiogram in 2017 without stent placement. No hx of MI. No current CP.   - continue Toprol XL 50mg BID   - continue Lipitor 20mg QHS  - continue Plavix 75mg QD

## 2018-03-31 LAB — GLUCOSE BLDC GLUCOMTR-MCNC: 110 MG/DL — HIGH (ref 70–99)

## 2018-03-31 PROCEDURE — 99232 SBSQ HOSP IP/OBS MODERATE 35: CPT | Mod: GC

## 2018-03-31 PROCEDURE — 99223 1ST HOSP IP/OBS HIGH 75: CPT

## 2018-03-31 PROCEDURE — 93970 EXTREMITY STUDY: CPT | Mod: 26

## 2018-03-31 RX ORDER — POTASSIUM CHLORIDE 20 MEQ
1 PACKET (EA) ORAL
Qty: 0 | Refills: 0 | COMMUNITY

## 2018-03-31 RX ADMIN — ATORVASTATIN CALCIUM 20 MILLIGRAM(S): 80 TABLET, FILM COATED ORAL at 22:07

## 2018-03-31 RX ADMIN — Medication 1 PUFF(S): at 06:27

## 2018-03-31 RX ADMIN — Medication 2 TABLET(S): at 22:20

## 2018-03-31 RX ADMIN — FAMOTIDINE 20 MILLIGRAM(S): 10 INJECTION INTRAVENOUS at 06:27

## 2018-03-31 RX ADMIN — Medication 25 MICROGRAM(S): at 06:27

## 2018-03-31 RX ADMIN — Medication 50 MILLIGRAM(S): at 06:27

## 2018-03-31 RX ADMIN — Medication 1 PUFF(S): at 22:07

## 2018-03-31 RX ADMIN — Medication 10 MILLIGRAM(S): at 13:02

## 2018-03-31 RX ADMIN — Medication 50 MILLIGRAM(S): at 18:42

## 2018-03-31 RX ADMIN — CLOPIDOGREL BISULFATE 75 MILLIGRAM(S): 75 TABLET, FILM COATED ORAL at 22:07

## 2018-03-31 RX ADMIN — ENOXAPARIN SODIUM 40 MILLIGRAM(S): 100 INJECTION SUBCUTANEOUS at 06:27

## 2018-03-31 RX ADMIN — FAMOTIDINE 20 MILLIGRAM(S): 10 INJECTION INTRAVENOUS at 18:42

## 2018-03-31 RX ADMIN — POLYETHYLENE GLYCOL 3350 17 GRAM(S): 17 POWDER, FOR SOLUTION ORAL at 22:07

## 2018-03-31 NOTE — PROGRESS NOTE ADULT - SUBJECTIVE AND OBJECTIVE BOX
INTERVAL HPI/OVERNIGHT EVENTS:    Patient is a 82y old  Female who presents with a chief complaint of malignancy (30 Mar 2018 15:45) Ms whyte ,82 year old white female was admitted for abdominal cramps & diarrhoea & work up revealed recurrent metastatic uterine carcinoma with huge soft tissue masses in retroperitineal areas bilaterally & they are coprssing if venacava on the right & encasing aorta going all the way to common Iliac arteries.She needs further work up & to plan for susequent appropriate therapy.      Pt reports the following symptoms:    CONSTITUTIONAL:  Negative fever or chills, feels well, good appetite  EYES:  Negative  blurry vision or double vision  CARDIOVASCULAR:  Negative for chest pain or palpitations  RESPIRATORY:  Negative for cough, wheezing, or SOB   GASTROINTESTINAL:  Negative for nausea, vomiting, diarrhea, constipation, or abdominal pain  GENITOURINARY:  Negative frequency, urgency or dysuria  NEUROLOGIC:  No headache, confusion, dizziness, lightheadedness    MEDICATIONS  (STANDING):  atorvastatin 20 milliGRAM(s) Oral at bedtime  buDESOnide  180 MICROgram(s) Inhaler 1 Puff(s) Inhalation two times a day  clopidogrel Tablet 75 milliGRAM(s) Oral daily  dextrose 5%. 1000 milliLiter(s) (50 mL/Hr) IV Continuous <Continuous>  dextrose 50% Injectable 12.5 Gram(s) IV Push once  dextrose 50% Injectable 25 Gram(s) IV Push once  dextrose 50% Injectable 25 Gram(s) IV Push once  enoxaparin Injectable 40 milliGRAM(s) SubCutaneous every 24 hours  ergocalciferol 64230 Unit(s) Oral every week  famotidine    Tablet 20 milliGRAM(s) Oral two times a day  insulin lispro (HumaLOG) corrective regimen sliding scale   SubCutaneous Before meals and at bedtime  levothyroxine 25 MICROGram(s) Oral daily  metoprolol succinate ER 50 milliGRAM(s) Oral two times a day  oxybutynin 10 milliGRAM(s) Oral daily    MEDICATIONS  (PRN):  acetaminophen   Tablet. 650 milliGRAM(s) Oral every 6 hours PRN Moderate Pain (4 - 6)  calcium carbonate 500 mG (Tums) Chewable 2 Tablet(s) Chew every 8 hours PRN Heartburn  dextrose Gel 1 Dose(s) Oral once PRN Blood Glucose LESS THAN 70 milliGRAM(s)/deciliter  glucagon  Injectable 1 milliGRAM(s) IntraMuscular once PRN Glucose LESS THAN 70 milligrams/deciliter  polyethylene glycol 3350 17 Gram(s) Oral two times a day PRN Constipation      PHYSICAL EXAM  Vital Signs Last 24 Hrs  T(C): 37 (31 Mar 2018 11:17), Max: 37 (31 Mar 2018 11:17)  T(F): 98.6 (31 Mar 2018 11:17), Max: 98.6 (31 Mar 2018 11:17)  HR: 82 (31 Mar 2018 11:17) (70 - 94)  BP: 102/68 (31 Mar 2018 11:17) (95/64 - 145/73)  BP(mean): --  RR: 14 (31 Mar 2018 11:17) (14 - 18)  SpO2: 95% (31 Mar 2018 11:17) (95% - 98%)    Constitutional: wn/wd in NAD.   HEENT: NCAT, MMM, OP clear, EOMI, , no proptosis or lid retraction  Neck: no thyromegaly or palpable thyroid nodules   Respiratory: lungs CTAB.  Cardiovascular: regular rhythm, normal S1 and S2, no audible murmurs, no peripheral edema  GI: soft, NT/ND, no masses/HSM appreciated.  Neurology: no tremors, DTR 2+  Skin: no visible rashes/lesions  Psychiatric: AAO x 3, normal affect/mood.    LABS:                        11.1   9.6   )-----------( 236      ( 30 Mar 2018 07:08 )             33.6     03-30    134<L>  |  99  |  14  ----------------------------<  112<H>  4.0   |  23  |  0.92    Ca    9.8      30 Mar 2018 07:08  Mg     1.6     03-30    TPro  8.5<H>  /  Alb  4.1  /  TBili  0.7  /  DBili  x   /  AST  20  /  ALT  10  /  AlkPhos  90  03-29      Urinalysis Basic - ( 30 Mar 2018 06:46 )    Color: Yellow / Appearance: Clear / S.010 / pH: x  Gluc: x / Ketone: NEGATIVE  / Bili: Negative / Urobili: 1.0 E.U./dL   Blood: x / Protein: NEGATIVE mg/dL / Nitrite: NEGATIVE   Leuk Esterase: NEGATIVE / RBC: < 5 /HPF / WBC < 5 /HPF   Sq Epi: x / Non Sq Epi: 0-5 /HPF / Bacteria: Present /HPF      Thyroid Stimulating Hormone, Serum: 2.599 uIU/mL ( @ 07:08)      HbA1C: 5.4 % ( @ 07:08)          Insulin Sliding Scale requirements X 24 Hours:      RADIOLOGY & ADDITIONAL TESTS:      A/P: 82y Female with history of DM type II presenting for       1.  DM -     Please continue   lispro moderate / low dose sliding scale 4 times daily with meals and at bedtime.  Please continue consistent carbohydrate diet.      Goal FSG is 100mg/dl & premeal < 180mg/dl  Will continue to monitor   For discharge, pt can continue

## 2018-03-31 NOTE — PROGRESS NOTE ADULT - SUBJECTIVE AND OBJECTIVE BOX
Overnight Events: BRAULIO  Subjective/ROS: Patient reports having acid reflux after eating last nigh. Endorses flatus and improvement in abdominal pain. Denies headache, dizziness, lightheadedness, chest pain, shortness of breath, palpitations, abdominal pain, nausea, vomiting, diarrhea, fevers, and chills.     VITAL SIGNS:  Vital Signs Last 24 Hrs  T(C): 37 (31 Mar 2018 11:17), Max: 37 (31 Mar 2018 11:17)  T(F): 98.6 (31 Mar 2018 11:17), Max: 98.6 (31 Mar 2018 11:17)  HR: 82 (31 Mar 2018 11:17) (70 - 94)  BP: 102/68 (31 Mar 2018 11:17) (95/64 - 145/73)  BP(mean): --  RR: 14 (31 Mar 2018 11:17) (14 - 18)  SpO2: 95% (31 Mar 2018 11:17) (95% - 95%)    PHYSICAL EXAM:    General: Well nourished elderly woman resting comfortably in bed; NAD  HEENT: NC/AT; EOMI, PERRLA, anicteric sclera; MMM  Neck: supple, no JVD  Cardiovascular: +S1/S2; RRR, no r/m/g  Respiratory: CTA B/L; no W/R/R  Gastrointestinal: soft, NT/ND; +BS  Extremities: WWP; trace pedal edema, no clubbing or cyanosis; left leg slight greater than right in size  Vascular: 2+ radial, DP pulses B/L  Neurological: AAOx3; no focal deficits    MEDICATIONS:  MEDICATIONS  (STANDING):  atorvastatin 20 milliGRAM(s) Oral at bedtime  buDESOnide  180 MICROgram(s) Inhaler 1 Puff(s) Inhalation two times a day  clopidogrel Tablet 75 milliGRAM(s) Oral daily  dextrose 5%. 1000 milliLiter(s) (50 mL/Hr) IV Continuous <Continuous>  dextrose 50% Injectable 12.5 Gram(s) IV Push once  dextrose 50% Injectable 25 Gram(s) IV Push once  dextrose 50% Injectable 25 Gram(s) IV Push once  enoxaparin Injectable 40 milliGRAM(s) SubCutaneous every 24 hours  ergocalciferol 37844 Unit(s) Oral every week  famotidine    Tablet 20 milliGRAM(s) Oral two times a day  insulin lispro (HumaLOG) corrective regimen sliding scale   SubCutaneous Before meals and at bedtime  levothyroxine 25 MICROGram(s) Oral daily  metoprolol succinate ER 50 milliGRAM(s) Oral two times a day  oxybutynin 10 milliGRAM(s) Oral daily    MEDICATIONS  (PRN):  acetaminophen   Tablet. 650 milliGRAM(s) Oral every 6 hours PRN Moderate Pain (4 - 6)  calcium carbonate 500 mG (Tums) Chewable 2 Tablet(s) Chew every 8 hours PRN Heartburn  dextrose Gel 1 Dose(s) Oral once PRN Blood Glucose LESS THAN 70 milliGRAM(s)/deciliter  glucagon  Injectable 1 milliGRAM(s) IntraMuscular once PRN Glucose LESS THAN 70 milligrams/deciliter  polyethylene glycol 3350 17 Gram(s) Oral two times a day PRN Constipation      ALLERGIES:  Allergies    codeine (Other)  erythromycin (Rash)  influenza virus vaccine, inactivated (Hives)  Originally Entered as [Hives] reaction to [Achromycin] (Hives)  tetracycline (Rash)  tetracyclines (Rash)    Intolerances        LABS:                        11.1   9.6   )-----------( 236      ( 30 Mar 2018 07:08 )             33.6     03-30    134<L>  |  99  |  14  ----------------------------<  112<H>  4.0   |  23  |  0.92    Ca    9.8      30 Mar 2018 07:08  Mg     1.6     03-30    TPro  8.5<H>  /  Alb  4.1  /  TBili  0.7  /  DBili  x   /  AST  20  /  ALT  10  /  AlkPhos  90  03-      Urinalysis Basic - ( 30 Mar 2018 06:46 )    Color: Yellow / Appearance: Clear / S.010 / pH: x  Gluc: x / Ketone: NEGATIVE  / Bili: Negative / Urobili: 1.0 E.U./dL   Blood: x / Protein: NEGATIVE mg/dL / Nitrite: NEGATIVE   Leuk Esterase: NEGATIVE / RBC: < 5 /HPF / WBC < 5 /HPF   Sq Epi: x / Non Sq Epi: 0-5 /HPF / Bacteria: Present /HPF      CAPILLARY BLOOD GLUCOSE      POCT Blood Glucose.: 110 mg/dL (31 Mar 2018 08:28)      RADIOLOGY & ADDITIONAL TESTS: Reviewed. Overnight Events: BRAULIO  Subjective/ROS: Patient reports having acid reflux after eating last nigh. Endorses flatus and improvement in abdominal pain. Denies headache, dizziness, lightheadedness, chest pain, shortness of breath, palpitations, abdominal pain, nausea, vomiting, diarrhea, fevers, and chills.     VITAL SIGNS:  Vital Signs Last 24 Hrs  T(C): 37 (31 Mar 2018 11:17), Max: 37 (31 Mar 2018 11:17)  T(F): 98.6 (31 Mar 2018 11:17), Max: 98.6 (31 Mar 2018 11:17)  HR: 82 (31 Mar 2018 11:17) (70 - 94)  BP: 102/68 (31 Mar 2018 11:17) (95/64 - 145/73)  BP(mean): --  RR: 14 (31 Mar 2018 11:17) (14 - 18)  SpO2: 95% (31 Mar 2018 11:17) (95% - 95%)    PHYSICAL EXAM:    General: Well nourished elderly woman resting comfortably in bed; NAD  HEENT: NC/AT; EOMI, PERRLA, anicteric sclera; MMM  Neck: supple, no JVD  Cardiovascular: +S1/S2; RRR, no r/m/g  Respiratory: CTA B/L; no W/R/R  Gastrointestinal: soft, NT/ND; +BS  Extremities: WWP; trace pedal edema, no clubbing or cyanosis; left leg slightly greater than right in size  Vascular: 2+ radial, DP pulses B/L  Neurological: AAOx3; no focal deficits    MEDICATIONS:  MEDICATIONS  (STANDING):  atorvastatin 20 milliGRAM(s) Oral at bedtime  buDESOnide  180 MICROgram(s) Inhaler 1 Puff(s) Inhalation two times a day  clopidogrel Tablet 75 milliGRAM(s) Oral daily  dextrose 5%. 1000 milliLiter(s) (50 mL/Hr) IV Continuous <Continuous>  dextrose 50% Injectable 12.5 Gram(s) IV Push once  dextrose 50% Injectable 25 Gram(s) IV Push once  dextrose 50% Injectable 25 Gram(s) IV Push once  enoxaparin Injectable 40 milliGRAM(s) SubCutaneous every 24 hours  ergocalciferol 48959 Unit(s) Oral every week  famotidine    Tablet 20 milliGRAM(s) Oral two times a day  insulin lispro (HumaLOG) corrective regimen sliding scale   SubCutaneous Before meals and at bedtime  levothyroxine 25 MICROGram(s) Oral daily  metoprolol succinate ER 50 milliGRAM(s) Oral two times a day  oxybutynin 10 milliGRAM(s) Oral daily    MEDICATIONS  (PRN):  acetaminophen   Tablet. 650 milliGRAM(s) Oral every 6 hours PRN Moderate Pain (4 - 6)  calcium carbonate 500 mG (Tums) Chewable 2 Tablet(s) Chew every 8 hours PRN Heartburn  dextrose Gel 1 Dose(s) Oral once PRN Blood Glucose LESS THAN 70 milliGRAM(s)/deciliter  glucagon  Injectable 1 milliGRAM(s) IntraMuscular once PRN Glucose LESS THAN 70 milligrams/deciliter  polyethylene glycol 3350 17 Gram(s) Oral two times a day PRN Constipation      ALLERGIES:  Allergies    codeine (Other)  erythromycin (Rash)  influenza virus vaccine, inactivated (Hives)  Originally Entered as [Hives] reaction to [Achromycin] (Hives)  tetracycline (Rash)  tetracyclines (Rash)    Intolerances        LABS:                        11.1   9.6   )-----------( 236      ( 30 Mar 2018 07:08 )             33.6     03-30    134<L>  |  99  |  14  ----------------------------<  112<H>  4.0   |  23  |  0.92    Ca    9.8      30 Mar 2018 07:08  Mg     1.6     03-30    TPro  8.5<H>  /  Alb  4.1  /  TBili  0.7  /  DBili  x   /  AST  20  /  ALT  10  /  AlkPhos  90  03-      Urinalysis Basic - ( 30 Mar 2018 06:46 )    Color: Yellow / Appearance: Clear / S.010 / pH: x  Gluc: x / Ketone: NEGATIVE  / Bili: Negative / Urobili: 1.0 E.U./dL   Blood: x / Protein: NEGATIVE mg/dL / Nitrite: NEGATIVE   Leuk Esterase: NEGATIVE / RBC: < 5 /HPF / WBC < 5 /HPF   Sq Epi: x / Non Sq Epi: 0-5 /HPF / Bacteria: Present /HPF      CAPILLARY BLOOD GLUCOSE      POCT Blood Glucose.: 110 mg/dL (31 Mar 2018 08:28)      RADIOLOGY & ADDITIONAL TESTS: Reviewed.

## 2018-03-31 NOTE — PROGRESS NOTE ADULT - SUBJECTIVE AND OBJECTIVE BOX
Pt seen and examined at bedside.      INTERVAL HPI/OVERNIGHT EVENTS:  No c/o this AM. Seen by gyn/onc yesterday. Feel mass unlikely to be of gyn origin, but given her hx warrants w/up. LE dopplers ordered yesterday evening by attending, not clear why.    Review of Systems: 12 point review of systems otherwise negative    MEDICATIONS  (STANDING):  atorvastatin 20 milliGRAM(s) Oral at bedtime  buDESOnide  180 MICROgram(s) Inhaler 1 Puff(s) Inhalation two times a day  clopidogrel Tablet 75 milliGRAM(s) Oral daily  dextrose 5%. 1000 milliLiter(s) (50 mL/Hr) IV Continuous <Continuous>  dextrose 50% Injectable 12.5 Gram(s) IV Push once  dextrose 50% Injectable 25 Gram(s) IV Push once  dextrose 50% Injectable 25 Gram(s) IV Push once  enoxaparin Injectable 40 milliGRAM(s) SubCutaneous every 24 hours  ergocalciferol 00162 Unit(s) Oral every week  famotidine    Tablet 20 milliGRAM(s) Oral two times a day  insulin lispro (HumaLOG) corrective regimen sliding scale   SubCutaneous Before meals and at bedtime  levothyroxine 25 MICROGram(s) Oral daily  metoprolol succinate ER 50 milliGRAM(s) Oral two times a day  oxybutynin 10 milliGRAM(s) Oral daily    MEDICATIONS  (PRN):  acetaminophen   Tablet. 650 milliGRAM(s) Oral every 6 hours PRN Moderate Pain (4 - 6)  calcium carbonate 500 mG (Tums) Chewable 2 Tablet(s) Chew every 8 hours PRN Heartburn  dextrose Gel 1 Dose(s) Oral once PRN Blood Glucose LESS THAN 70 milliGRAM(s)/deciliter  glucagon  Injectable 1 milliGRAM(s) IntraMuscular once PRN Glucose LESS THAN 70 milligrams/deciliter  polyethylene glycol 3350 17 Gram(s) Oral two times a day PRN Constipation      Allergies    codeine (Other)  erythromycin (Rash)  influenza virus vaccine, inactivated (Hives)  Originally Entered as [Hives] reaction to [Achromycin] (Hives)  tetracycline (Rash)  tetracyclines (Rash)    Intolerances        Vital Signs Last 24 Hrs  T(C): 37 (31 Mar 2018 11:17), Max: 37 (31 Mar 2018 11:17)  T(F): 98.6 (31 Mar 2018 11:17), Max: 98.6 (31 Mar 2018 11:17)  HR: 82 (31 Mar 2018 11:17) (70 - 94)  BP: 102/68 (31 Mar 2018 11:17) (95/64 - 145/73)  BP(mean): --  RR: 14 (31 Mar 2018 11:17) (14 - 18)  SpO2: 95% (31 Mar 2018 11:17) (95% - 98%)  CAPILLARY BLOOD GLUCOSE      POCT Blood Glucose.: 110 mg/dL (31 Mar 2018 08:28)  POCT Blood Glucose.: 122 mg/dL (30 Mar 2018 21:23)  POCT Blood Glucose.: 93 mg/dL (30 Mar 2018 18:06)          Gen: NAD  CV: RRR, no murmurs  Pulm: CTAB  Abd: soft, NTND  Ext: trace b/l MANISHA    LABS:                        11.1   9.6   )-----------( 236      ( 30 Mar 2018 07:08 )             33.6     03-30    134<L>  |  99  |  14  ----------------------------<  112<H>  4.0   |  23  |  0.92    Ca    9.8      30 Mar 2018 07:08  Mg     1.6     03-30    TPro  8.5<H>  /  Alb  4.1  /  TBili  0.7  /  DBili  x   /  AST  20  /  ALT  10  /  AlkPhos  90  03-29      Urinalysis Basic - ( 30 Mar 2018 06:46 )    Color: Yellow / Appearance: Clear / S.010 / pH: x  Gluc: x / Ketone: NEGATIVE  / Bili: Negative / Urobili: 1.0 E.U./dL   Blood: x / Protein: NEGATIVE mg/dL / Nitrite: NEGATIVE   Leuk Esterase: NEGATIVE / RBC: < 5 /HPF / WBC < 5 /HPF   Sq Epi: x / Non Sq Epi: 0-5 /HPF / Bacteria: Present /HPF            RADIOLOGY & ADDITIONAL TESTS:    ---------------------------------------------------------------------------  I personally reviewed: [  ]EKG   [  ]CXR    [  ] CT    [  ]Other  ---------------------------------------------------------------------------

## 2018-03-31 NOTE — PROGRESS NOTE ADULT - SUBJECTIVE AND OBJECTIVE BOX
GYN Progress Note    Patient seen at bedside with Gynecologic Oncology attending Dr. Patel.  Reviewed patient's history of prior uterine cancer treated with SARA/BSO and radiation ~17 years ago.  She reports that she was previously following up with Dr. Saba Silver however has not been back over the past two years.  As previously noted she was admitted to the medicine service with intractable diarrhea and abdominal distension and was found to have extensive soft tissue masses in pelvis, retroperitoneum, and peritoneum; unclear what origin at this time.  Given her history of prior uterine cancer would consider recurrent disease as possible etiology, however cannot rule out other primary neoplasm as source.  The patient confirms that she would be amenable to treatment and is interested in pursuing a thorough workup to determine whether she is a candidate for possible treatment.        Vital Signs Last 24 Hrs  T(C): 37 (31 Mar 2018 11:17), Max: 37 (31 Mar 2018 11:17)  T(F): 98.6 (31 Mar 2018 11:17), Max: 98.6 (31 Mar 2018 11:17)  HR: 82 (31 Mar 2018 11:17) (70 - 89)  BP: 102/68 (31 Mar 2018 11:17) (95/64 - 145/73)  BP(mean): --  RR: 14 (31 Mar 2018 11:17) (14 - 16)  SpO2: 95% (31 Mar 2018 11:17) (95% - 95%)    Physical Exam:  Gen: No Acute Distress  Pulm: normal work of breathing      I&O's Summary    MEDICATIONS  (STANDING):  atorvastatin 20 milliGRAM(s) Oral at bedtime  buDESOnide  180 MICROgram(s) Inhaler 1 Puff(s) Inhalation two times a day  clopidogrel Tablet 75 milliGRAM(s) Oral daily  enoxaparin Injectable 40 milliGRAM(s) SubCutaneous every 24 hours  ergocalciferol 79051 Unit(s) Oral every week  famotidine    Tablet 20 milliGRAM(s) Oral two times a day  levothyroxine 25 MICROGram(s) Oral daily  metoprolol succinate ER 50 milliGRAM(s) Oral two times a day  oxybutynin 10 milliGRAM(s) Oral daily    MEDICATIONS  (PRN):  acetaminophen   Tablet. 650 milliGRAM(s) Oral every 6 hours PRN Moderate Pain (4 - 6)  calcium carbonate 500 mG (Tums) Chewable 2 Tablet(s) Chew every 8 hours PRN Heartburn  polyethylene glycol 3350 17 Gram(s) Oral two times a day PRN Constipation      LABS:                        11.1   9.6   )-----------( 236      ( 30 Mar 2018 07:08 )             33.6     03-30    134<L>  |  99  |  14  ----------------------------<  112<H>  4.0   |  23  |  0.92    Ca    9.8      30 Mar 2018 07:08  Mg     1.6     30        Urinalysis Basic - ( 30 Mar 2018 06:46 )    Color: Yellow / Appearance: Clear / S.010 / pH: x  Gluc: x / Ketone: NEGATIVE  / Bili: Negative / Urobili: 1.0 E.U./dL   Blood: x / Protein: NEGATIVE mg/dL / Nitrite: NEGATIVE   Leuk Esterase: NEGATIVE / RBC: < 5 /HPF / WBC < 5 /HPF   Sq Epi: x / Non Sq Epi: 0-5 /HPF / Bacteria: Present /HPF

## 2018-04-01 PROCEDURE — 99232 SBSQ HOSP IP/OBS MODERATE 35: CPT | Mod: GC

## 2018-04-01 RX ORDER — DOCUSATE SODIUM 100 MG
100 CAPSULE ORAL
Qty: 0 | Refills: 0 | Status: DISCONTINUED | OUTPATIENT
Start: 2018-04-01 | End: 2018-04-02

## 2018-04-01 RX ADMIN — Medication 1 PUFF(S): at 18:13

## 2018-04-01 RX ADMIN — FAMOTIDINE 20 MILLIGRAM(S): 10 INJECTION INTRAVENOUS at 06:45

## 2018-04-01 RX ADMIN — FAMOTIDINE 20 MILLIGRAM(S): 10 INJECTION INTRAVENOUS at 18:13

## 2018-04-01 RX ADMIN — ENOXAPARIN SODIUM 40 MILLIGRAM(S): 100 INJECTION SUBCUTANEOUS at 06:46

## 2018-04-01 RX ADMIN — Medication 1 PUFF(S): at 06:45

## 2018-04-01 RX ADMIN — Medication 100 MILLIGRAM(S): at 21:45

## 2018-04-01 RX ADMIN — Medication 10 MILLIGRAM(S): at 11:51

## 2018-04-01 RX ADMIN — Medication 25 MICROGRAM(S): at 06:45

## 2018-04-01 RX ADMIN — Medication 50 MILLIGRAM(S): at 18:13

## 2018-04-01 RX ADMIN — ATORVASTATIN CALCIUM 20 MILLIGRAM(S): 80 TABLET, FILM COATED ORAL at 21:45

## 2018-04-01 RX ADMIN — Medication 50 MILLIGRAM(S): at 06:45

## 2018-04-01 NOTE — PROGRESS NOTE ADULT - SUBJECTIVE AND OBJECTIVE BOX
Pt seen and examined at bedside.      INTERVAL HPI/OVERNIGHT EVENTS:  No c/o this AM. LE dopplers neg for DVT. Interested in pursuing IR guided biopsy while in house     Review of Systems: 12 point review of systems otherwise negative    MEDICATIONS  (STANDING):  atorvastatin 20 milliGRAM(s) Oral at bedtime  buDESOnide  180 MICROgram(s) Inhaler 1 Puff(s) Inhalation two times a day  docusate sodium 100 milliGRAM(s) Oral two times a day  ergocalciferol 23962 Unit(s) Oral every week  famotidine    Tablet 20 milliGRAM(s) Oral two times a day  levothyroxine 25 MICROGram(s) Oral daily  metoprolol succinate ER 50 milliGRAM(s) Oral two times a day  oxybutynin 10 milliGRAM(s) Oral daily    MEDICATIONS  (PRN):  acetaminophen   Tablet. 650 milliGRAM(s) Oral every 6 hours PRN Moderate Pain (4 - 6)  calcium carbonate 500 mG (Tums) Chewable 2 Tablet(s) Chew every 8 hours PRN Heartburn  polyethylene glycol 3350 17 Gram(s) Oral two times a day PRN Constipation      Allergies    codeine (Other)  erythromycin (Rash)  influenza virus vaccine, inactivated (Hives)  Originally Entered as [Hives] reaction to [Achromycin] (Hives)  tetracycline (Rash)  tetracyclines (Rash)    Intolerances        Vital Signs Last 24 Hrs  T(C): 36.4 (01 Apr 2018 11:54), Max: 37.2 (31 Mar 2018 21:00)  T(F): 97.6 (01 Apr 2018 11:54), Max: 99 (31 Mar 2018 21:00)  HR: 78 (01 Apr 2018 11:54) (75 - 86)  BP: 115/78 (01 Apr 2018 11:54) (106/71 - 130/73)  BP(mean): --  RR: 16 (01 Apr 2018 11:54) (15 - 16)  SpO2: 97% (01 Apr 2018 11:54) (95% - 97%)  CAPILLARY BLOOD GLUCOSE              Gen: NAD  CV: RRR, no murmurs  Pulm: CTAB  Abd: soft, NTND  Ext: no MANISHA    LABS:                    RADIOLOGY & ADDITIONAL TESTS:    ---------------------------------------------------------------------------  I personally reviewed: [  ]EKG   [  ]CXR    [  ] CT    [  ]Other  ---------------------------------------------------------------------------

## 2018-04-01 NOTE — PROGRESS NOTE ADULT - SUBJECTIVE AND OBJECTIVE BOX
INTERVAL HPI/OVERNIGHT EVENTS:    Patient is a 82y old  Female who presents with a chief complaint of malignancy (30 Mar 2018 15:45)she is feeling better,she is scheduled for Biopsy by IR of masses detected in Ctscan to find the exact pathology .It is scheduled for tomorrow by hernan Ndiaye      Pt reports the following symptoms:    CONSTITUTIONAL:  Negative fever or chills, feels well, good appetite  EYES:  Negative  blurry vision or double vision  CARDIOVASCULAR:  Negative for chest pain or palpitations  RESPIRATORY:  Negative for cough, wheezing, or SOB   GASTROINTESTINAL:  Negative for nausea, vomiting, diarrhea, constipation, or abdominal pain  GENITOURINARY:  Negative frequency, urgency or dysuria  NEUROLOGIC:  No headache, confusion, dizziness, lightheadedness    MEDICATIONS  (STANDING):  atorvastatin 20 milliGRAM(s) Oral at bedtime  buDESOnide  180 MICROgram(s) Inhaler 1 Puff(s) Inhalation two times a day  docusate sodium 100 milliGRAM(s) Oral two times a day  ergocalciferol 96459 Unit(s) Oral every week  famotidine    Tablet 20 milliGRAM(s) Oral two times a day  levothyroxine 25 MICROGram(s) Oral daily  metoprolol succinate ER 50 milliGRAM(s) Oral two times a day  oxybutynin 10 milliGRAM(s) Oral daily    MEDICATIONS  (PRN):  acetaminophen   Tablet. 650 milliGRAM(s) Oral every 6 hours PRN Moderate Pain (4 - 6)  calcium carbonate 500 mG (Tums) Chewable 2 Tablet(s) Chew every 8 hours PRN Heartburn  polyethylene glycol 3350 17 Gram(s) Oral two times a day PRN Constipation      PHYSICAL EXAM  Vital Signs Last 24 Hrs  T(C): 36.4 (01 Apr 2018 11:54), Max: 37.2 (31 Mar 2018 21:00)  T(F): 97.6 (01 Apr 2018 11:54), Max: 99 (31 Mar 2018 21:00)  HR: 78 (01 Apr 2018 11:54) (75 - 86)  BP: 115/78 (01 Apr 2018 11:54) (106/71 - 130/73)  BP(mean): --  RR: 16 (01 Apr 2018 11:54) (15 - 16)  SpO2: 97% (01 Apr 2018 11:54) (95% - 97%)    Constitutional: wn/wd in NAD.   HEENT: NCAT, MMM, OP clear, EOMI, , no proptosis or lid retraction  Neck: no thyromegaly or palpable thyroid nodules   Respiratory: lungs CTAB.  Cardiovascular: regular rhythm, normal S1 and S2, no audible murmurs, no peripheral edema  GI: soft, NT/ND, no masses/HSM appreciated.  Neurology: no tremors, DTR 2+  Skin: no visible rashes/lesions  Psychiatric: AAO x 3, normal affect/mood.    LABS:              Thyroid Stimulating Hormone, Serum: 2.599 uIU/mL (03-30 @ 07:08)      HbA1C: 5.4 % (03-30 @ 07:08)          Insulin Sliding Scale requirements X 24 Hours:      RADIOLOGY & ADDITIONAL TESTS:      A/P: 82y Female with history of DM type II presenting for       1.  DM -     Please continue      lispro moderate / low dose sliding scale 4 times daily with meals and at bedtime.  Please continue consistent carbohydrate diet.      Goal FSG is 100mg/dl  & premeal of <180mg/dl  Will continue to monitor   For discharge, pt can continue

## 2018-04-01 NOTE — PROGRESS NOTE ADULT - SUBJECTIVE AND OBJECTIVE BOX
GYN Progress Note    Patient seen at bedside. No events. Patient to receive IR-guided biopsy of pelvic mass tomorrow. Patient expressing anxiety regarding pain from the procedure. Patient also reinforcing that she is DNR. She reports a good appetite today and tolerated lunch, however now has abdominal pain which she attributes to gas. She had a small, hard bowel movement this morning. Denies nausea, vomiting, diarrhea, fevers, chills, vaginal bleeding.       Vital Signs Last 24 Hrs  T(C): 36.4 (01 Apr 2018 11:54), Max: 37.2 (31 Mar 2018 21:00)  T(F): 97.6 (01 Apr 2018 11:54), Max: 99 (31 Mar 2018 21:00)  HR: 78 (01 Apr 2018 11:54) (75 - 86)  BP: 115/78 (01 Apr 2018 11:54) (106/71 - 130/73)  BP(mean): --  RR: 16 (01 Apr 2018 11:54) (15 - 16)  SpO2: 97% (01 Apr 2018 11:54) (95% - 97%)    Physical Exam:  Gen: No Acute Distress  Pulm: normal work of breathing  Abdomen: soft, nontender, nondistended, no rebound or guarding  Extremities: nontender, no edema    MEDICATIONS  (STANDING):  atorvastatin 20 milliGRAM(s) Oral at bedtime  buDESOnide  180 MICROgram(s) Inhaler 1 Puff(s) Inhalation two times a day  docusate sodium 100 milliGRAM(s) Oral two times a day  ergocalciferol 50202 Unit(s) Oral every week  famotidine    Tablet 20 milliGRAM(s) Oral two times a day  levothyroxine 25 MICROGram(s) Oral daily  metoprolol succinate ER 50 milliGRAM(s) Oral two times a day  oxybutynin 10 milliGRAM(s) Oral daily    MEDICATIONS  (PRN):  acetaminophen   Tablet. 650 milliGRAM(s) Oral every 6 hours PRN Moderate Pain (4 - 6)  calcium carbonate 500 mG (Tums) Chewable 2 Tablet(s) Chew every 8 hours PRN Heartburn  polyethylene glycol 3350 17 Gram(s) Oral two times a day PRN Constipation

## 2018-04-02 ENCOUNTER — RESULT REVIEW (OUTPATIENT)
Age: 83
End: 2018-04-02

## 2018-04-02 VITALS
OXYGEN SATURATION: 94 % | SYSTOLIC BLOOD PRESSURE: 113 MMHG | RESPIRATION RATE: 18 BRPM | DIASTOLIC BLOOD PRESSURE: 73 MMHG | HEART RATE: 85 BPM | TEMPERATURE: 98 F

## 2018-04-02 DIAGNOSIS — C54.1 MALIGNANT NEOPLASM OF ENDOMETRIUM: ICD-10-CM

## 2018-04-02 LAB
ANION GAP SERPL CALC-SCNC: 13 MMOL/L — SIGNIFICANT CHANGE UP (ref 5–17)
APTT BLD: 31.8 SEC — SIGNIFICANT CHANGE UP (ref 27.5–37.4)
BLD GP AB SCN SERPL QL: NEGATIVE — SIGNIFICANT CHANGE UP
BUN SERPL-MCNC: 18 MG/DL — SIGNIFICANT CHANGE UP (ref 7–23)
CALCIUM SERPL-MCNC: 10.4 MG/DL — SIGNIFICANT CHANGE UP (ref 8.4–10.5)
CHLORIDE SERPL-SCNC: 98 MMOL/L — SIGNIFICANT CHANGE UP (ref 96–108)
CO2 SERPL-SCNC: 24 MMOL/L — SIGNIFICANT CHANGE UP (ref 22–31)
CREAT SERPL-MCNC: 1.11 MG/DL — SIGNIFICANT CHANGE UP (ref 0.5–1.3)
GLUCOSE SERPL-MCNC: 118 MG/DL — HIGH (ref 70–99)
HCT VFR BLD CALC: 35.9 % — SIGNIFICANT CHANGE UP (ref 34.5–45)
HGB BLD-MCNC: 12 G/DL — SIGNIFICANT CHANGE UP (ref 11.5–15.5)
INR BLD: 1.1 — SIGNIFICANT CHANGE UP (ref 0.88–1.16)
MAGNESIUM SERPL-MCNC: 2 MG/DL — SIGNIFICANT CHANGE UP (ref 1.6–2.6)
MCHC RBC-ENTMCNC: 29.7 PG — SIGNIFICANT CHANGE UP (ref 27–34)
MCHC RBC-ENTMCNC: 33.4 G/DL — SIGNIFICANT CHANGE UP (ref 32–36)
MCV RBC AUTO: 88.9 FL — SIGNIFICANT CHANGE UP (ref 80–100)
PLATELET # BLD AUTO: 255 K/UL — SIGNIFICANT CHANGE UP (ref 150–400)
POTASSIUM SERPL-MCNC: 4.4 MMOL/L — SIGNIFICANT CHANGE UP (ref 3.5–5.3)
POTASSIUM SERPL-SCNC: 4.4 MMOL/L — SIGNIFICANT CHANGE UP (ref 3.5–5.3)
PROTHROM AB SERPL-ACNC: 12.2 SEC — SIGNIFICANT CHANGE UP (ref 9.8–12.7)
RBC # BLD: 4.04 M/UL — SIGNIFICANT CHANGE UP (ref 3.8–5.2)
RBC # FLD: 13.2 % — SIGNIFICANT CHANGE UP (ref 10.3–16.9)
RH IG SCN BLD-IMP: POSITIVE — SIGNIFICANT CHANGE UP
SODIUM SERPL-SCNC: 135 MMOL/L — SIGNIFICANT CHANGE UP (ref 135–145)
WBC # BLD: 7.5 K/UL — SIGNIFICANT CHANGE UP (ref 3.8–10.5)
WBC # FLD AUTO: 7.5 K/UL — SIGNIFICANT CHANGE UP (ref 3.8–10.5)

## 2018-04-02 PROCEDURE — 85730 THROMBOPLASTIN TIME PARTIAL: CPT

## 2018-04-02 PROCEDURE — 77012 CT SCAN FOR NEEDLE BIOPSY: CPT

## 2018-04-02 PROCEDURE — 88341 IMHCHEM/IMCYTCHM EA ADD ANTB: CPT

## 2018-04-02 PROCEDURE — 83735 ASSAY OF MAGNESIUM: CPT

## 2018-04-02 PROCEDURE — 77012 CT SCAN FOR NEEDLE BIOPSY: CPT | Mod: 26

## 2018-04-02 PROCEDURE — 81001 URINALYSIS AUTO W/SCOPE: CPT

## 2018-04-02 PROCEDURE — 84443 ASSAY THYROID STIM HORMONE: CPT

## 2018-04-02 PROCEDURE — 83690 ASSAY OF LIPASE: CPT

## 2018-04-02 PROCEDURE — 94640 AIRWAY INHALATION TREATMENT: CPT

## 2018-04-02 PROCEDURE — 49180 BIOPSY ABDOMINAL MASS: CPT

## 2018-04-02 PROCEDURE — 88173 CYTOPATH EVAL FNA REPORT: CPT

## 2018-04-02 PROCEDURE — 88360 TUMOR IMMUNOHISTOCHEM/MANUAL: CPT

## 2018-04-02 PROCEDURE — 86850 RBC ANTIBODY SCREEN: CPT

## 2018-04-02 PROCEDURE — 36415 COLL VENOUS BLD VENIPUNCTURE: CPT

## 2018-04-02 PROCEDURE — 71260 CT THORAX DX C+: CPT

## 2018-04-02 PROCEDURE — 86901 BLOOD TYPING SEROLOGIC RH(D): CPT

## 2018-04-02 PROCEDURE — 74176 CT ABD & PELVIS W/O CONTRAST: CPT

## 2018-04-02 PROCEDURE — 85027 COMPLETE CBC AUTOMATED: CPT

## 2018-04-02 PROCEDURE — 83036 HEMOGLOBIN GLYCOSYLATED A1C: CPT

## 2018-04-02 PROCEDURE — 99239 HOSP IP/OBS DSCHRG MGMT >30: CPT

## 2018-04-02 PROCEDURE — 82962 GLUCOSE BLOOD TEST: CPT

## 2018-04-02 PROCEDURE — 85610 PROTHROMBIN TIME: CPT

## 2018-04-02 PROCEDURE — 85025 COMPLETE CBC W/AUTO DIFF WBC: CPT

## 2018-04-02 PROCEDURE — 99285 EMERGENCY DEPT VISIT HI MDM: CPT | Mod: 25

## 2018-04-02 PROCEDURE — 80053 COMPREHEN METABOLIC PANEL: CPT

## 2018-04-02 PROCEDURE — 88305 TISSUE EXAM BY PATHOLOGIST: CPT

## 2018-04-02 PROCEDURE — 80048 BASIC METABOLIC PNL TOTAL CA: CPT

## 2018-04-02 PROCEDURE — 93970 EXTREMITY STUDY: CPT

## 2018-04-02 PROCEDURE — C1769: CPT

## 2018-04-02 PROCEDURE — 86900 BLOOD TYPING SEROLOGIC ABO: CPT

## 2018-04-02 RX ORDER — CLOPIDOGREL BISULFATE 75 MG/1
75 TABLET, FILM COATED ORAL DAILY
Qty: 0 | Refills: 0 | Status: DISCONTINUED | OUTPATIENT
Start: 2018-04-02 | End: 2018-04-02

## 2018-04-02 RX ADMIN — Medication 1 PUFF(S): at 06:25

## 2018-04-02 RX ADMIN — FAMOTIDINE 20 MILLIGRAM(S): 10 INJECTION INTRAVENOUS at 06:25

## 2018-04-02 RX ADMIN — Medication 100 MILLIGRAM(S): at 15:21

## 2018-04-02 RX ADMIN — Medication 10 MILLIGRAM(S): at 15:21

## 2018-04-02 RX ADMIN — Medication 50 MILLIGRAM(S): at 06:25

## 2018-04-02 RX ADMIN — CLOPIDOGREL BISULFATE 75 MILLIGRAM(S): 75 TABLET, FILM COATED ORAL at 15:21

## 2018-04-02 RX ADMIN — Medication 25 MICROGRAM(S): at 06:25

## 2018-04-02 NOTE — PROGRESS NOTE ADULT - PROBLEM SELECTOR PLAN 10
F: None  E: monitor and replete lytes PRN   N: diabetic/DASH diet     PPx: Lovenox     DISPO: [id number]
F: None  E: monitor and replete lytes PRN   N: NPO prior to IR guided biopsy     PPx: Holding lovenox for IR guided biopsy    DISPO: [email or web address]

## 2018-04-02 NOTE — PROGRESS NOTE ADULT - PROVIDER SPECIALTY LIST ADULT
Endocrinology
Gyn Onc
Gyn Onc
Hospitalist
Hospitalist
Internal Medicine
Internal Medicine

## 2018-04-02 NOTE — PROGRESS NOTE ADULT - PROBLEM SELECTOR PLAN 6
Reports hx of preDM.  - Dc'd ISS as patient required no insulin coverage  - f/u A1c and if no DM, can discontinue ISS
Reports hx of preDM.  - ISS, FSG  - f/u A1c and if no DM, can discontinue ISS

## 2018-04-02 NOTE — PROGRESS NOTE ADULT - PROBLEM SELECTOR PLAN 8
On Ranitidine 300mg BID at home.   - Famotidine 20mg BID (therapeutic interchange)  - Tums PRN
On Ranitidine 300mg BID at home.   - Famotidine 20mg BID (therapeutic interchange)  - Tums PRN

## 2018-04-02 NOTE — PROGRESS NOTE ADULT - PROBLEM SELECTOR PLAN 2
CT A/P notable for multiple retroperitoneal masses involving the bilateral kidneys as well as L adnexa, with one mass compressing the IVC posteriorly, another partially encasing the abdominal aorta, and another extending into the L iliopsoas muscle. Concern for metastatic disease from primary uterine cancer from 2011 vs new primary with metastatic disease. Patient aware of current findings and reports previously being told that she continued to have "active" cancer despite surgical and radiation therapy of uterine cancer in 2011; has not had oncological follow up with treatment as she was asymptomatic.   - Gyn onc recommending outpatient followup  - Palliative has seen patientBLAYNE in chart
CT A/P notable for multiple retroperitoneal masses involving the bilateral kidneys as well as L adnexa, with one mass compressing the IVC posteriorly, another partially encasing the abdominal aorta, and another extending into the L iliopsoas muscle. Concern for metastatic disease from primary uterine cancer from 2011 vs new primary with metastatic disease. Patient aware of current findings and reports previously being told that she continued to have "active" cancer despite surgical and radiation therapy of uterine cancer in 2011; has not had oncological follow up with treatment as she was asymptomatic.   - IR guided biopsy of mass planned for today  - Gyn-onc following; appreciate recs   - Palliative has seen patientBLAYNE in chart

## 2018-04-02 NOTE — PROGRESS NOTE ADULT - PROBLEM SELECTOR PLAN 3
s/p hysterectomy on 9/10/01 with subsequent radiation therapy at that time.   - plan as above
s/p hysterectomy on 9/10/01 with subsequent radiation therapy at that time.   - plan as above

## 2018-04-02 NOTE — PROGRESS NOTE ADULT - SUBJECTIVE AND OBJECTIVE BOX
INTERVAL HPI/OVERNIGHT EVENTS:    Patient is a 82y old  Female who presents with a chief complaint of malignancy (30 Mar 2018 15:45)      Pt reports the following symptoms:    CONSTITUTIONAL:  Negative fever or chills, feels well, good appetite  EYES:  Negative  blurry vision or double vision  CARDIOVASCULAR:  Negative for chest pain or palpitations  RESPIRATORY:  Negative for cough, wheezing, or SOB   GASTROINTESTINAL:  Negative for nausea, vomiting, diarrhea, constipation, or abdominal pain  GENITOURINARY:  Negative frequency, urgency or dysuria  NEUROLOGIC:  No headache, confusion, dizziness, lightheadedness    MEDICATIONS  (STANDING):  atorvastatin 20 milliGRAM(s) Oral at bedtime  buDESOnide  180 MICROgram(s) Inhaler 1 Puff(s) Inhalation two times a day  clopidogrel Tablet 75 milliGRAM(s) Oral daily  docusate sodium 100 milliGRAM(s) Oral two times a day  ergocalciferol 90115 Unit(s) Oral every week  famotidine    Tablet 20 milliGRAM(s) Oral two times a day  levothyroxine 25 MICROGram(s) Oral daily  metoprolol succinate ER 50 milliGRAM(s) Oral two times a day  oxybutynin 10 milliGRAM(s) Oral daily    MEDICATIONS  (PRN):  acetaminophen   Tablet. 650 milliGRAM(s) Oral every 6 hours PRN Moderate Pain (4 - 6)  calcium carbonate 500 mG (Tums) Chewable 2 Tablet(s) Chew every 8 hours PRN Heartburn  polyethylene glycol 3350 17 Gram(s) Oral two times a day PRN Constipation      PHYSICAL EXAM  Vital Signs Last 24 Hrs  T(C): 36.6 (02 Apr 2018 11:24), Max: 37.2 (02 Apr 2018 05:27)  T(F): 97.8 (02 Apr 2018 11:24), Max: 98.9 (02 Apr 2018 05:27)  HR: 85 (02 Apr 2018 11:24) (73 - 85)  BP: 113/73 (02 Apr 2018 11:24) (101/62 - 117/77)  BP(mean): --  RR: 18 (02 Apr 2018 11:24) (14 - 18)  SpO2: 94% (02 Apr 2018 11:24) (94% - 96%)    Constitutional: wn/wd in NAD.   HEENT: NCAT, MMM, OP clear, EOMI, , no proptosis or lid retraction  Neck: no thyromegaly or palpable thyroid nodules   Respiratory: lungs CTAB.  Cardiovascular: regular rhythm, normal S1 and S2, no audible murmurs, no peripheral edema  GI: soft, NT/ND, no masses/HSM appreciated.  Neurology: no tremors, DTR 2+  Skin: no visible rashes/lesions  Psychiatric: AAO x 3, normal affect/mood.    LABS:                        12.0   7.5   )-----------( 255      ( 02 Apr 2018 06:26 )             35.9     04-02    135  |  98  |  18  ----------------------------<  118<H>  4.4   |  24  |  1.11    Ca    10.4      02 Apr 2018 06:26  Mg     2.0     04-02      PT/INR - ( 02 Apr 2018 06:26 )   PT: 12.2 sec;   INR: 1.10          PTT - ( 02 Apr 2018 06:26 )  PTT:31.8 sec    Thyroid Stimulating Hormone, Serum: 2.599 uIU/mL (03-30 @ 07:08)      HbA1C: 5.4 % (03-30 @ 07:08)          Insulin Sliding Scale requirements X 24 Hours:      RADIOLOGY & ADDITIONAL TESTS:      A/P: 82y Female with history of DM type II presenting for       1.  DM -     Please continue  low dose sliding scale 4 times daily with meals and at bedtime.  Please continue consistent carbohydrate diet.      Goal FSG is   Will continue to monitor   For discharge, pt can continue

## 2018-04-02 NOTE — PROGRESS NOTE ADULT - NSHPATTENDINGPLANDISCUSS_GEN_ALL_CORE
patient  & her friend who was visiting her at the time I visited her.
patient & 7 [facility] staff
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
house staff

## 2018-04-02 NOTE — PROGRESS NOTE ADULT - PROBLEM SELECTOR PLAN 9
No wheezing on exam.  - continue home Pulmicort BID  - start Duonebs Q6hrs PRN    #Hyponatremia: Na 134, likely 2/2 poor PO intake   - continue NS@70cc/hr    #Hypochloremia: Cl 95, likely 2/2 poor PO intake   - continue NS@70cc/hr
No wheezing on exam.  - continue home Pulmicort BID  - start Duonebs Q6hrs PRN    #Hyponatremia: Na 134, likely 2/2 poor PO intake   - continue NS@70cc/hr    #Hypochloremia: Cl 95, likely 2/2 poor PO intake   - continue NS@70cc/hr

## 2018-04-02 NOTE — PROGRESS NOTE ADULT - PROBLEM SELECTOR PLAN 7
Recently started on Synthroid, however patient reports noncompliance.   - continue home Synthroid 25mcg QD   - TSH wnl
Recently started on Synthroid, however patient reports noncompliance.   - continue home Synthroid 25mcg QD   - TSH wnl

## 2018-04-02 NOTE — PROGRESS NOTE ADULT - PROBLEM SELECTOR PLAN 1
Patient presents with vague, pressure-like, crampy abdominal pain that began yesterday. Can be pain 2/2 multiple retroperitoneal masses seen on CT imaging, but can also due to relative constipation in this patient who normally has diarrhea and now hasn't had a bowel movement in a few days. Can also consider UTI.   - continue to monitor for BMs, on miralax PRN  - Tylenol 650mg Q6hrs PRN for moderate pain   - F/u CT chest   - F/u LE doppler US
Patient presents with vague, pressure-like, crampy abdominal pain that began on day prior to admission. Can be pain 2/2 multiple retroperitoneal masses seen on CT imaging, but can also due to relative constipation.  - continue to monitor for BMs, on miralax PRN  - Tylenol 650mg Q6hrs PRN for moderate pain   - F/u CT chest

## 2018-04-02 NOTE — PROGRESS NOTE ADULT - PROBLEM SELECTOR PLAN 4
Patient reports taking Spironolactone and Isosorbide mononitrate at home for HTN. On admission, BPs stable without antihypertensives. Likely 2/2 poor PO intake.   - hold Spironolactone 25mg TID and Isosorbide mononitrate 30mg QHS; restart as tolerated (spironolactone for alopecia)
Patient reports taking Spironolactone and Isosorbide mononitrate at home for HTN. On admission, BPs stable without antihypertensives. Likely 2/2 poor PO intake.   - hold Spironolactone 25mg TID and Isosorbide mononitrate 30mg QHS; restart as tolerated (spironolactone for alopecia)

## 2018-04-02 NOTE — PROGRESS NOTE ADULT - ASSESSMENT
82F with PMHx of HTN, non-obstructive CAD (last angiogram in 2017, no hx of stents), preDM, hypothyroidism, hx of uterine cancer s/p hysterectomy and radiation therapy in 2001p/w abdominal pain, found to have extensive retroperitoneal masses c/w metastatic disease, admitted for oncological workup.     #Retroperitoneal masses  - f/u onc recs; rec outpatient gyn onc f/up with patient's oncologist Dr. Silver  - only trace MANISHA on exam/no calf pain, but attending ordered LE dopplers yesterday? -> f/u LE dopplers    #HTN  Normotensive to hypotensive off home antiHTN  - hold    #Hypothyroidism  TSH 2.6  - cont synthroid    #Dispo  - d/c to home png MANISHA dopplers
82F with PMHx of HTN, non-obstructive CAD (last angiogram in 2017, no hx of stents), preDM, hypothyroidism, hx of uterine cancer s/p hysterectomy and radiation therapy in 2011 (followed previously by Dr. Saba Silver, reports being told at that time that she has evidence of metastatic disease to lymph nodes and "bloodstream" but hasn't had oncologic treatment) who presents with complaints of new abdominal pressure, cramp-like pain in the LLQ >RLQ since yesterday, found to have extensive retroperitoneal masses consistent with metastatic disease, admitted for oncological workup.     #Retroperitoneal mass  - IR guided biopsy tmrw  - can f/up outpt gyn onc after biopsy    #HTN  Hypo to normotensive off home antiHTN  - cont holding    #Dispo  - d/c with gyn onc f/up after biopsy tmrw
82F with PMHx of HTN, non-obstructive CAD (last angiogram in 2017, no hx of stents), preDM, hypothyroidism, hx of uterine cancer s/p hysterectomy and radiation therapy in 2011 (followed previously by Dr. Saba Silver, reports being told at that time that she has evidence of metastatic disease to lymph nodes but hasn't had oncologic treatment) who presents with complaints of new abdominal pressure, cramp-like pain in the LLQ >RLQ since yesterday, found to have extensive retroperitoneal masses consistent with metastatic disease, admitted for oncological workup, awaiting IR guided biopsy of mass.
83 y/o with h/o uterine cancer s/p SARA, BSO, staging and radiation in 2001 now presenting with abdominal pain, distension, and diarrhea, diffuse soft tissue masses in peritoneum raising concern for recurrent disease versus new primary.  Without tissue diagnosis cannot determine etiology or prognosis at this time.  Patient on the schedule for IR guided biopsy of mass for tomorrow. If of gynecologic origin could be candidate for chemotherapy, to be discussed at greater length once pathology has resulted.
83 y/o with h/o uterine cancer s/p SARA, BSO, staging and radiation in 2001 now presenting with abdominal pain, distension, and diarrhea, diffuse soft tissue masses in peritoneum raising concern for recurrent disease versus new primary.  Without tissue diagnosis cannot determine etiology or prognosis at this time.  Would recommend IR guided biopsy to obtain sample for pathology, which could be done in house or outpatient if patient is discharged prior to coordinating procedure.  If of gynecologic origin could be candidate for chemotherapy, to be discussed at greater length once pathology has resulted.
Agree with SOAP & the plan of obtaininig tissue diagnosis of masses detected in CT Scan by I.R
Agree with SOAP & with treatment plan as outlined above & will follow her in my office as out patient.
Agree with SOAP .her finger sticks & diabetes is well controlled,however she needs the work up subsequent planfor therapy.john is well aware of her condition ,possible therapy & prognois.
82F with PMHx of HTN, non-obstructive CAD (last angiogram in 2017, no hx of stents), preDM, hypothyroidism, hx of uterine cancer s/p hysterectomy and radiation therapy in 2011 (followed previously by Dr. Saba Silver, reports being told at that time that she has evidence of metastatic disease to lymph nodes and "bloodstream" but hasn't had oncologic treatment) who presents with complaints of new abdominal pressure, cramp-like pain in the LLQ >RLQ since yesterday, found to have extensive retroperitoneal masses consistent with metastatic disease, admitted for oncological workup.

## 2018-04-02 NOTE — PROGRESS NOTE ADULT - PROBLEM SELECTOR PLAN 5
Reports nonobstructive coronary disease, s/p angiogram in 2017 without stent placement. No hx of MI. No current CP.   - continue Toprol XL 50mg BID   - continue Lipitor 20mg QHS  - Holding Plavix 75mg QD for IR guided biopsy
Reports nonobstructive coronary disease, s/p angiogram in 2017 without stent placement. No hx of MI. No current CP.   - continue Toprol XL 50mg BID   - continue Lipitor 20mg QHS  - continue Plavix 75mg QD

## 2018-04-03 LAB — NON-GYNECOLOGICAL CYTOLOGY STUDY: SIGNIFICANT CHANGE UP

## 2018-04-04 DIAGNOSIS — L65.9 NONSCARRING HAIR LOSS, UNSPECIFIED: ICD-10-CM

## 2018-04-04 DIAGNOSIS — H35.30 UNSPECIFIED MACULAR DEGENERATION: ICD-10-CM

## 2018-04-04 DIAGNOSIS — K21.9 GASTRO-ESOPHAGEAL REFLUX DISEASE WITHOUT ESOPHAGITIS: ICD-10-CM

## 2018-04-04 DIAGNOSIS — I87.1 COMPRESSION OF VEIN: ICD-10-CM

## 2018-04-04 DIAGNOSIS — E03.9 HYPOTHYROIDISM, UNSPECIFIED: ICD-10-CM

## 2018-04-04 DIAGNOSIS — F42.3 HOARDING DISORDER: ICD-10-CM

## 2018-04-04 DIAGNOSIS — E78.5 HYPERLIPIDEMIA, UNSPECIFIED: ICD-10-CM

## 2018-04-04 DIAGNOSIS — I25.10 ATHEROSCLEROTIC HEART DISEASE OF NATIVE CORONARY ARTERY WITHOUT ANGINA PECTORIS: ICD-10-CM

## 2018-04-04 DIAGNOSIS — Z51.5 ENCOUNTER FOR PALLIATIVE CARE: ICD-10-CM

## 2018-04-04 DIAGNOSIS — E11.9 TYPE 2 DIABETES MELLITUS WITHOUT COMPLICATIONS: ICD-10-CM

## 2018-04-04 DIAGNOSIS — K59.00 CONSTIPATION, UNSPECIFIED: ICD-10-CM

## 2018-04-04 DIAGNOSIS — E87.1 HYPO-OSMOLALITY AND HYPONATREMIA: ICD-10-CM

## 2018-04-04 DIAGNOSIS — Z87.891 PERSONAL HISTORY OF NICOTINE DEPENDENCE: ICD-10-CM

## 2018-04-04 DIAGNOSIS — R19.00 INTRA-ABDOMINAL AND PELVIC SWELLING, MASS AND LUMP, UNSPECIFIED SITE: ICD-10-CM

## 2018-04-04 DIAGNOSIS — Z88.1 ALLERGY STATUS TO OTHER ANTIBIOTIC AGENTS STATUS: ICD-10-CM

## 2018-04-04 DIAGNOSIS — Z92.3 PERSONAL HISTORY OF IRRADIATION: ICD-10-CM

## 2018-04-04 DIAGNOSIS — C79.89 SECONDARY MALIGNANT NEOPLASM OF OTHER SPECIFIED SITES: ICD-10-CM

## 2018-04-04 DIAGNOSIS — C78.6 SECONDARY MALIGNANT NEOPLASM OF RETROPERITONEUM AND PERITONEUM: ICD-10-CM

## 2018-04-04 DIAGNOSIS — E83.52 HYPERCALCEMIA: ICD-10-CM

## 2018-04-04 DIAGNOSIS — J45.909 UNSPECIFIED ASTHMA, UNCOMPLICATED: ICD-10-CM

## 2018-04-04 DIAGNOSIS — Z88.5 ALLERGY STATUS TO NARCOTIC AGENT: ICD-10-CM

## 2018-04-04 DIAGNOSIS — I10 ESSENTIAL (PRIMARY) HYPERTENSION: ICD-10-CM

## 2018-04-04 DIAGNOSIS — C55 MALIGNANT NEOPLASM OF UTERUS, PART UNSPECIFIED: ICD-10-CM

## 2018-04-04 LAB — SURGICAL PATHOLOGY STUDY: SIGNIFICANT CHANGE UP

## 2018-04-06 PROBLEM — C54.1 ENDOMETRIAL CANCER: Status: ACTIVE | Noted: 2018-04-06

## 2018-05-08 NOTE — CONSULT NOTE ADULT - PROBLEM SELECTOR RECOMMENDATION 2
likely secondary to suspected abd mets.  CT(A/P) shows soft tissue RP and peritoneal masses with larger mass encasing aortic, possible adnexal mass.  Pt reports crampy "period pain" for last 3wks but denies any need for meds for symptomatic relief at this time
I will SWITCH the dose or number of times a day I take the medications listed below when I get home from the hospital:  None

## 2024-02-09 NOTE — PROGRESS NOTE ADULT - ATTENDING COMMENTS
Agree with SOAP & appreciated Gyn oncology  & hospitalist's follow up & plan of obtaining the tissue diagnosis by IR of masses detected by CT-scan.
Agree with SOAP & with thetreatment & follow up plan.
Agree with SOAP,Her diabetes is well controlled & she is doing well ,However she does need further work up & close follow up by Gyn oncologist,Dr. 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
No
dx  adnexal and retroperitoneal masses - concern for uterine cancer (recurrence) given hx of...s/p biopsy of RP lesion today. will have pt follow up with gyn -onc (she prefers Dr BALJINDER Silver- whom she has seen before)   pt does express  that she would NOT want chemo if this does turn out to be cancer.     abd pain- well controlled. likely due to adenexal/intra-abd/RP masses - tumors. Tylenol prn  hypercalcemia - suspect due to malignancy. resolved w/ IVFs.   CAD - stable. no CP.   left lower ext edema -  duplex (-) DVT  chronic asthma - stable. no wheeze . pulmicort. albuterol prn.   htn- cont metoprolol. BPs at goal .

## 2025-02-15 NOTE — CONSULT NOTE ADULT - PROBLEM SELECTOR PROBLEM 3
Filiberto tiene gripe. Se trata de un virus que mejorará por sí solo con el tiempo. Continúe alternando entre ibuprofeno y Tylenol para el dolor y la fiebre. Puede darle Robitussin para la tos. Si presenta más náuseas y vómitos, puede darle helena de las tabletas de Zofran cada 6 a 8 horas. Utilice delbert medicamentos habituales para el asma según sea necesario. Realice un seguimiento con garvey médico de atención primaria si los síntomas persisten.   
Palliative care by specialist